# Patient Record
Sex: FEMALE | Race: WHITE | URBAN - METROPOLITAN AREA
[De-identification: names, ages, dates, MRNs, and addresses within clinical notes are randomized per-mention and may not be internally consistent; named-entity substitution may affect disease eponyms.]

---

## 2017-06-04 ENCOUNTER — EMERGENCY (EMERGENCY)
Facility: HOSPITAL | Age: 47
LOS: 0 days | Discharge: HOME | End: 2017-06-05

## 2017-06-28 DIAGNOSIS — R05 COUGH: ICD-10-CM

## 2017-06-28 DIAGNOSIS — R11.0 NAUSEA: ICD-10-CM

## 2017-06-28 DIAGNOSIS — F17.200 NICOTINE DEPENDENCE, UNSPECIFIED, UNCOMPLICATED: ICD-10-CM

## 2017-06-28 DIAGNOSIS — R07.9 CHEST PAIN, UNSPECIFIED: ICD-10-CM

## 2017-06-28 DIAGNOSIS — R10.11 RIGHT UPPER QUADRANT PAIN: ICD-10-CM

## 2017-10-11 ENCOUNTER — OUTPATIENT (OUTPATIENT)
Dept: OUTPATIENT SERVICES | Facility: HOSPITAL | Age: 47
LOS: 1 days | Discharge: HOME | End: 2017-10-11

## 2017-10-11 DIAGNOSIS — Z12.31 ENCOUNTER FOR SCREENING MAMMOGRAM FOR MALIGNANT NEOPLASM OF BREAST: ICD-10-CM

## 2017-10-17 ENCOUNTER — OUTPATIENT (OUTPATIENT)
Dept: OUTPATIENT SERVICES | Facility: HOSPITAL | Age: 47
LOS: 1 days | Discharge: HOME | End: 2017-10-17

## 2017-10-17 DIAGNOSIS — R92.8 OTHER ABNORMAL AND INCONCLUSIVE FINDINGS ON DIAGNOSTIC IMAGING OF BREAST: ICD-10-CM

## 2017-10-18 ENCOUNTER — OUTPATIENT (OUTPATIENT)
Dept: OUTPATIENT SERVICES | Facility: HOSPITAL | Age: 47
LOS: 1 days | Discharge: HOME | End: 2017-10-18

## 2017-10-18 DIAGNOSIS — R10.2 PELVIC AND PERINEAL PAIN: ICD-10-CM

## 2017-10-25 ENCOUNTER — OUTPATIENT (OUTPATIENT)
Dept: OUTPATIENT SERVICES | Facility: HOSPITAL | Age: 47
LOS: 1 days | Discharge: HOME | End: 2017-10-25

## 2017-10-25 DIAGNOSIS — N63.10 UNSPECIFIED LUMP IN THE RIGHT BREAST, UNSPECIFIED QUADRANT: ICD-10-CM

## 2017-10-25 DIAGNOSIS — D24.1 BENIGN NEOPLASM OF RIGHT BREAST: ICD-10-CM

## 2017-11-09 ENCOUNTER — OUTPATIENT (OUTPATIENT)
Dept: OUTPATIENT SERVICES | Facility: HOSPITAL | Age: 47
LOS: 1 days | Discharge: HOME | End: 2017-11-09

## 2017-11-09 DIAGNOSIS — R05 COUGH: ICD-10-CM

## 2017-11-14 PROBLEM — Z00.00 ENCOUNTER FOR PREVENTIVE HEALTH EXAMINATION: Status: ACTIVE | Noted: 2017-11-14

## 2018-04-22 ENCOUNTER — EMERGENCY (EMERGENCY)
Facility: HOSPITAL | Age: 48
LOS: 0 days | Discharge: HOME | End: 2018-04-22
Attending: EMERGENCY MEDICINE | Admitting: EMERGENCY MEDICINE

## 2018-04-22 VITALS
TEMPERATURE: 99 F | HEART RATE: 86 BPM | SYSTOLIC BLOOD PRESSURE: 126 MMHG | OXYGEN SATURATION: 100 % | RESPIRATION RATE: 18 BRPM | DIASTOLIC BLOOD PRESSURE: 76 MMHG

## 2018-04-22 DIAGNOSIS — R31.9 HEMATURIA, UNSPECIFIED: ICD-10-CM

## 2018-04-22 DIAGNOSIS — F17.200 NICOTINE DEPENDENCE, UNSPECIFIED, UNCOMPLICATED: ICD-10-CM

## 2018-04-22 DIAGNOSIS — D25.9 LEIOMYOMA OF UTERUS, UNSPECIFIED: ICD-10-CM

## 2018-04-22 LAB
ALBUMIN SERPL ELPH-MCNC: 4.3 G/DL — SIGNIFICANT CHANGE UP (ref 3.5–5.2)
ALP SERPL-CCNC: 46 U/L — SIGNIFICANT CHANGE UP (ref 30–115)
ALT FLD-CCNC: 11 U/L — SIGNIFICANT CHANGE UP (ref 0–41)
ANION GAP SERPL CALC-SCNC: 13 MMOL/L — SIGNIFICANT CHANGE UP (ref 7–14)
APPEARANCE UR: CLEAR — SIGNIFICANT CHANGE UP
AST SERPL-CCNC: 13 U/L — SIGNIFICANT CHANGE UP (ref 0–41)
BACTERIA # UR AUTO: (no result) /HPF
BASOPHILS # BLD AUTO: 0.04 K/UL — SIGNIFICANT CHANGE UP (ref 0–0.2)
BASOPHILS NFR BLD AUTO: 0.6 % — SIGNIFICANT CHANGE UP (ref 0–1)
BILIRUB SERPL-MCNC: 0.3 MG/DL — SIGNIFICANT CHANGE UP (ref 0.2–1.2)
BILIRUB UR-MCNC: NEGATIVE — SIGNIFICANT CHANGE UP
BUN SERPL-MCNC: 12 MG/DL — SIGNIFICANT CHANGE UP (ref 10–20)
CALCIUM SERPL-MCNC: 9.3 MG/DL — SIGNIFICANT CHANGE UP (ref 8.5–10.1)
CHLORIDE SERPL-SCNC: 107 MMOL/L — SIGNIFICANT CHANGE UP (ref 98–110)
CO2 SERPL-SCNC: 23 MMOL/L — SIGNIFICANT CHANGE UP (ref 17–32)
COLOR SPEC: YELLOW — SIGNIFICANT CHANGE UP
CREAT SERPL-MCNC: 0.9 MG/DL — SIGNIFICANT CHANGE UP (ref 0.7–1.5)
DIFF PNL FLD: (no result)
EOSINOPHIL # BLD AUTO: 0.1 K/UL — SIGNIFICANT CHANGE UP (ref 0–0.7)
EOSINOPHIL NFR BLD AUTO: 1.4 % — SIGNIFICANT CHANGE UP (ref 0–8)
GLUCOSE SERPL-MCNC: 105 MG/DL — HIGH (ref 70–99)
GLUCOSE UR QL: NEGATIVE MG/DL — SIGNIFICANT CHANGE UP
HCT VFR BLD CALC: 39.9 % — SIGNIFICANT CHANGE UP (ref 37–47)
HGB BLD-MCNC: 13.5 G/DL — SIGNIFICANT CHANGE UP (ref 12–16)
IMM GRANULOCYTES NFR BLD AUTO: 0.3 % — SIGNIFICANT CHANGE UP (ref 0.1–0.3)
KETONES UR-MCNC: NEGATIVE — SIGNIFICANT CHANGE UP
LEUKOCYTE ESTERASE UR-ACNC: NEGATIVE — SIGNIFICANT CHANGE UP
LYMPHOCYTES # BLD AUTO: 2.55 K/UL — SIGNIFICANT CHANGE UP (ref 1.2–3.4)
LYMPHOCYTES # BLD AUTO: 36.2 % — SIGNIFICANT CHANGE UP (ref 20.5–51.1)
MCHC RBC-ENTMCNC: 31.3 PG — HIGH (ref 27–31)
MCHC RBC-ENTMCNC: 33.8 G/DL — SIGNIFICANT CHANGE UP (ref 32–37)
MCV RBC AUTO: 92.4 FL — SIGNIFICANT CHANGE UP (ref 81–99)
MONOCYTES # BLD AUTO: 0.54 K/UL — SIGNIFICANT CHANGE UP (ref 0.1–0.6)
MONOCYTES NFR BLD AUTO: 7.7 % — SIGNIFICANT CHANGE UP (ref 1.7–9.3)
NEUTROPHILS # BLD AUTO: 3.8 K/UL — SIGNIFICANT CHANGE UP (ref 1.4–6.5)
NEUTROPHILS NFR BLD AUTO: 53.8 % — SIGNIFICANT CHANGE UP (ref 42.2–75.2)
NITRITE UR-MCNC: NEGATIVE — SIGNIFICANT CHANGE UP
NRBC # BLD: 0 /100 WBCS — SIGNIFICANT CHANGE UP (ref 0–0)
PH UR: 6 — SIGNIFICANT CHANGE UP (ref 5–8)
PLATELET # BLD AUTO: 260 K/UL — SIGNIFICANT CHANGE UP (ref 130–400)
POTASSIUM SERPL-MCNC: 4.1 MMOL/L — SIGNIFICANT CHANGE UP (ref 3.5–5)
POTASSIUM SERPL-SCNC: 4.1 MMOL/L — SIGNIFICANT CHANGE UP (ref 3.5–5)
PROT SERPL-MCNC: 6.7 G/DL — SIGNIFICANT CHANGE UP (ref 6–8)
PROT UR-MCNC: NEGATIVE MG/DL — SIGNIFICANT CHANGE UP
RBC # BLD: 4.32 M/UL — SIGNIFICANT CHANGE UP (ref 4.2–5.4)
RBC # FLD: 14.1 % — SIGNIFICANT CHANGE UP (ref 11.5–14.5)
RBC CASTS # UR COMP ASSIST: (no result) /HPF
SODIUM SERPL-SCNC: 143 MMOL/L — SIGNIFICANT CHANGE UP (ref 135–146)
SP GR SPEC: 1.02 — SIGNIFICANT CHANGE UP (ref 1.01–1.03)
UROBILINOGEN FLD QL: 1 MG/DL (ref 0.2–0.2)
WBC # BLD: 7.05 K/UL — SIGNIFICANT CHANGE UP (ref 4.8–10.8)
WBC # FLD AUTO: 7.05 K/UL — SIGNIFICANT CHANGE UP (ref 4.8–10.8)
WBC UR QL: SIGNIFICANT CHANGE UP /HPF

## 2018-04-22 NOTE — ED PROVIDER NOTE - PHYSICAL EXAMINATION
VITAL SIGNS: I have reviewed nursing notes and confirm.  CONSTITUTIONAL: Well-developed; well-nourished; in no acute distress. comfortable appearing, ambulating in ED   SKIN: Skin exam is warm and dry  HEAD: Normocephalic; atraumatic.  EYES: EOM intact; conjunctiva and sclera clear.  ENT: No nasal discharge; airway clear.   NECK: Supple; non tender.  CARD: S1, S2 normal; Regular rate and rhythm.  RESP: No wheezes, rales or rhonchi.  ABD: Normal bowel sounds; soft; non-distended; non-tender, minimal b/l CVAT, no suprapubic ttp  : (chaperone PCA Marge) - normal appearing external genitalia, + blood in vault, no discharge, no CMT, no adnexal ttp   EXT: Normal ROM. No LE edema.  NEURO: Alert, oriented. Grossly unremarkable. No focal deficits.  PSYCH: Cooperative, appropriate.

## 2018-04-22 NOTE — ED PROVIDER NOTE - OBJECTIVE STATEMENT
47y f hx fibroids, remote cervical ca, asthma, presents w pelvic pain. Associated with both hematuria and vaginal bleeding. Pain is intermittent, also w occasional back pain. Denies dysuria. LMP 2 weeks ago. No abdominal pain. No nausea, vomiting, diarrhea. No fever or chills. Patient had recent uterine and cervical biopsy yesterday after elevated estradiol level.

## 2018-04-22 NOTE — ED PROVIDER NOTE - MEDICAL DECISION MAKING DETAILS
patient w vaginal bleeding, US with fibroids, otherwise unremarkable workup. Will dc. She will follow with Dr. Flanagan. Strict return precautions given.

## 2018-04-22 NOTE — ED PROVIDER NOTE - NS ED ROS FT
Review of Systems    Constitutional: (-) fever  Cardiovascular: (-) chest pain, (-) syncope  Respiratory: (-) cough, (-) shortness of breath  Gastrointestinal: (-) vomiting, (-) diarrhea, (-) abdominal pain  Musculoskeletal: (-) neck pain, (-) back pain, (-) joint pain  Integumentary: (-) rash, (-) edema  Neurological: (-) headache, (-) altered mental status    Except as documented in the HPI, all other systems are negative.

## 2018-04-26 ENCOUNTER — OUTPATIENT (OUTPATIENT)
Dept: OUTPATIENT SERVICES | Facility: HOSPITAL | Age: 48
LOS: 1 days | Discharge: HOME | End: 2018-04-26

## 2018-04-26 ENCOUNTER — TRANSCRIPTION ENCOUNTER (OUTPATIENT)
Age: 48
End: 2018-04-26

## 2018-04-26 DIAGNOSIS — R92.8 OTHER ABNORMAL AND INCONCLUSIVE FINDINGS ON DIAGNOSTIC IMAGING OF BREAST: ICD-10-CM

## 2018-10-29 ENCOUNTER — OUTPATIENT (OUTPATIENT)
Dept: OUTPATIENT SERVICES | Facility: HOSPITAL | Age: 48
LOS: 1 days | Discharge: HOME | End: 2018-10-29

## 2018-10-29 DIAGNOSIS — N64.4 MASTODYNIA: ICD-10-CM

## 2018-12-04 ENCOUNTER — EMERGENCY (EMERGENCY)
Facility: HOSPITAL | Age: 48
LOS: 0 days | Discharge: HOME | End: 2018-12-04
Attending: EMERGENCY MEDICINE | Admitting: EMERGENCY MEDICINE

## 2018-12-04 ENCOUNTER — TRANSCRIPTION ENCOUNTER (OUTPATIENT)
Age: 48
End: 2018-12-04

## 2018-12-04 VITALS
OXYGEN SATURATION: 99 % | TEMPERATURE: 98 F | RESPIRATION RATE: 16 BRPM | DIASTOLIC BLOOD PRESSURE: 67 MMHG | SYSTOLIC BLOOD PRESSURE: 104 MMHG | HEART RATE: 64 BPM

## 2018-12-04 VITALS
HEART RATE: 65 BPM | DIASTOLIC BLOOD PRESSURE: 78 MMHG | TEMPERATURE: 98 F | OXYGEN SATURATION: 97 % | SYSTOLIC BLOOD PRESSURE: 119 MMHG | RESPIRATION RATE: 20 BRPM | HEIGHT: 65 IN | WEIGHT: 156.97 LBS

## 2018-12-04 DIAGNOSIS — M54.2 CERVICALGIA: ICD-10-CM

## 2018-12-04 DIAGNOSIS — M65.29: ICD-10-CM

## 2018-12-04 DIAGNOSIS — R68.84 JAW PAIN: ICD-10-CM

## 2018-12-04 DIAGNOSIS — H92.02 OTALGIA, LEFT EAR: ICD-10-CM

## 2018-12-04 RX ORDER — KETOROLAC TROMETHAMINE 30 MG/ML
1 SYRINGE (ML) INJECTION
Qty: 9 | Refills: 0
Start: 2018-12-04 | End: 2018-12-06

## 2018-12-04 RX ORDER — KETOROLAC TROMETHAMINE 30 MG/ML
15 SYRINGE (ML) INJECTION ONCE
Qty: 0 | Refills: 0 | Status: DISCONTINUED | OUTPATIENT
Start: 2018-12-04 | End: 2018-12-04

## 2018-12-04 RX ADMIN — Medication 15 MILLIGRAM(S): at 08:06

## 2018-12-04 NOTE — ED PROVIDER NOTE - NS ED ROS FT
Constitutional: Subjective chills yesterday, today. No fever, night sweats, weight loss.  ENMT:  Left ear, jaw pain. No tooth pain.   Cardiac:  No chest pain, SOB or edema. No chest pain with exertion.  Respiratory:  No cough or respiratory distress. No hemoptysis. No history of asthma or RAD.  GI:  No nausea, vomiting, diarrhea or abdominal pain.  MS:  Pain in left side of neck. Worsened with movement.  Neuro:  No parasthesias in UE's.  No weakness. No LOC.  Skin:  No skin rash.   Endocrine: No history of thyroid disease or diabetes.

## 2018-12-04 NOTE — ED ADULT NURSE NOTE - OBJECTIVE STATEMENT
pt reports that around 2 days ago, her neck began to ache after hanging antonie decorations. over time, pain became worsened. now pt can't move her neck and c/o pain

## 2018-12-04 NOTE — ED ADULT NURSE NOTE - NSIMPLEMENTINTERV_GEN_ALL_ED
Implemented All Universal Safety Interventions:  [place] to call system. Call bell, personal items and telephone within reach. Instruct patient to call for assistance. Room bathroom lighting operational. Non-slip footwear when patient is off stretcher. Physically safe environment: no spills, clutter or unnecessary equipment. Stretcher in lowest position, wheels locked, appropriate side rails in place.

## 2018-12-04 NOTE — ED PROVIDER NOTE - CARE PROVIDER_API CALL
Socrates Armenta (MD), [facility]  [address] [place] [address]  [address] [place] [address]  Phone: [phone]  Fax: [phone]

## 2018-12-04 NOTE — ED PROVIDER NOTE - ATTENDING CONTRIBUTION TO CARE
48 y F to ED with neck pain posterior that started while putting up decorations.  No fevers, no sick contacts, no travels, no injury or fall.  pain with looking up and palpation to posterior neck.  seen at [facility] and no relief with motrin/tizanidine    AVSS, exam as noted, CTAB, RRR, abdomen soft NTND, (+) bowel sounds, neuro nonfocal

## 2018-12-04 NOTE — ED PROVIDER NOTE - PHYSICAL EXAMINATION
Constitutional: Uncomfortable. Able to complete full sentences w/o difficulty.  Head: Atraumatic.  Eyes: PERRLA. EOMI without discomfort.   ENT: TM's visualized b/l. No assoc erythema, bulging, discharge.  Neck: TTP b/l paraspinally. Pain with active and passive ROM. No masses palpated, no thyromegaly.  Cardiovascular: Regular rate. Normal S1 and S2. No murmurs. 2+ pulses in all extremities.   Pulmonary: CTAB  Abdominal: Soft. Nondistended. Nontender. No rebound or guarding.   Skin: No rashes.   Neuro: AAOx3. No focal neurological deficits.

## 2018-12-04 NOTE — ED PROVIDER NOTE - OBJECTIVE STATEMENT
Pt with hx of cervical cancer, fibriods, asthma c/o left sided neck pain for the past 2 days a/w dysphagia, horseness, chills, and left sided ear pain that began yesterday. Pt unable to move her neck due to pain. Pt visited urgent care on Monday and received Tizanidine and Motrin, neither of which helped. Pt has FH of CA in her mother and father.

## 2018-12-04 NOTE — ED PROVIDER NOTE - PROGRESS NOTE DETAILS
Pt is PA. FH of cancer in mother and father. Personal hx cervical CA. CT spine noncon, IM toradol. Reassess after CT and toradol.

## 2018-12-10 ENCOUNTER — APPOINTMENT (OUTPATIENT)
Age: 48
End: 2018-12-10
Payer: COMMERCIAL

## 2018-12-10 VITALS — BODY MASS INDEX: 26.49 KG/M2 | HEIGHT: 65 IN | WEIGHT: 159 LBS

## 2018-12-10 PROCEDURE — 99203 OFFICE O/P NEW LOW 30 MIN: CPT

## 2018-12-17 ENCOUNTER — FORM ENCOUNTER (OUTPATIENT)
Age: 48
End: 2018-12-17

## 2018-12-18 ENCOUNTER — OUTPATIENT (OUTPATIENT)
Dept: OUTPATIENT SERVICES | Facility: HOSPITAL | Age: 48
LOS: 1 days | Discharge: HOME | End: 2018-12-18

## 2018-12-18 DIAGNOSIS — M54.2 CERVICALGIA: ICD-10-CM

## 2018-12-26 ENCOUNTER — APPOINTMENT (OUTPATIENT)
Dept: NEUROSURGERY | Facility: CLINIC | Age: 48
End: 2018-12-26
Payer: COMMERCIAL

## 2018-12-26 DIAGNOSIS — M47.12 OTHER SPONDYLOSIS WITH MYELOPATHY, CERVICAL REGION: ICD-10-CM

## 2018-12-26 DIAGNOSIS — M47.22 OTHER SPONDYLOSIS WITH MYELOPATHY, CERVICAL REGION: ICD-10-CM

## 2018-12-26 PROCEDURE — 99212 OFFICE O/P EST SF 10 MIN: CPT

## 2019-04-29 ENCOUNTER — EMERGENCY (EMERGENCY)
Facility: HOSPITAL | Age: 49
LOS: 0 days | Discharge: HOME | End: 2019-04-30
Attending: EMERGENCY MEDICINE | Admitting: EMERGENCY MEDICINE
Payer: COMMERCIAL

## 2019-04-29 ENCOUNTER — TRANSCRIPTION ENCOUNTER (OUTPATIENT)
Age: 49
End: 2019-04-29

## 2019-04-29 VITALS
SYSTOLIC BLOOD PRESSURE: 148 MMHG | OXYGEN SATURATION: 100 % | RESPIRATION RATE: 21 BRPM | TEMPERATURE: 98 F | HEART RATE: 80 BPM | DIASTOLIC BLOOD PRESSURE: 72 MMHG

## 2019-04-29 DIAGNOSIS — N92.0 EXCESSIVE AND FREQUENT MENSTRUATION WITH REGULAR CYCLE: ICD-10-CM

## 2019-04-29 DIAGNOSIS — R07.89 OTHER CHEST PAIN: ICD-10-CM

## 2019-04-29 DIAGNOSIS — Z82.49 FAMILY HISTORY OF ISCHEMIC HEART DISEASE AND OTHER DISEASES OF THE CIRCULATORY SYSTEM: ICD-10-CM

## 2019-04-29 DIAGNOSIS — R42 DIZZINESS AND GIDDINESS: ICD-10-CM

## 2019-04-29 DIAGNOSIS — R06.02 SHORTNESS OF BREATH: ICD-10-CM

## 2019-04-29 DIAGNOSIS — Z87.891 PERSONAL HISTORY OF NICOTINE DEPENDENCE: ICD-10-CM

## 2019-04-29 DIAGNOSIS — R07.9 CHEST PAIN, UNSPECIFIED: ICD-10-CM

## 2019-04-29 LAB
ALBUMIN SERPL ELPH-MCNC: 4.8 G/DL — SIGNIFICANT CHANGE UP (ref 3.5–5.2)
ALP SERPL-CCNC: 46 U/L — SIGNIFICANT CHANGE UP (ref 30–115)
ALT FLD-CCNC: 20 U/L — SIGNIFICANT CHANGE UP (ref 0–41)
ANION GAP SERPL CALC-SCNC: 15 MMOL/L — HIGH (ref 7–14)
APTT BLD: 29.7 SEC — SIGNIFICANT CHANGE UP (ref 27–39.2)
AST SERPL-CCNC: 27 U/L — SIGNIFICANT CHANGE UP (ref 0–41)
BASOPHILS # BLD AUTO: 0.05 K/UL — SIGNIFICANT CHANGE UP (ref 0–0.2)
BASOPHILS NFR BLD AUTO: 0.4 % — SIGNIFICANT CHANGE UP (ref 0–1)
BILIRUB SERPL-MCNC: 0.3 MG/DL — SIGNIFICANT CHANGE UP (ref 0.2–1.2)
BUN SERPL-MCNC: 12 MG/DL — SIGNIFICANT CHANGE UP (ref 10–20)
CALCIUM SERPL-MCNC: 10.2 MG/DL — HIGH (ref 8.5–10.1)
CHLORIDE SERPL-SCNC: 101 MMOL/L — SIGNIFICANT CHANGE UP (ref 98–110)
CO2 SERPL-SCNC: 22 MMOL/L — SIGNIFICANT CHANGE UP (ref 17–32)
CREAT SERPL-MCNC: 0.9 MG/DL — SIGNIFICANT CHANGE UP (ref 0.7–1.5)
D DIMER BLD IA.RAPID-MCNC: 314 NG/ML DDU — HIGH (ref 0–230)
EOSINOPHIL # BLD AUTO: 0.09 K/UL — SIGNIFICANT CHANGE UP (ref 0–0.7)
EOSINOPHIL NFR BLD AUTO: 0.7 % — SIGNIFICANT CHANGE UP (ref 0–8)
GLUCOSE SERPL-MCNC: 88 MG/DL — SIGNIFICANT CHANGE UP (ref 70–99)
HCT VFR BLD CALC: 38.4 % — SIGNIFICANT CHANGE UP (ref 37–47)
HGB BLD-MCNC: 13 G/DL — SIGNIFICANT CHANGE UP (ref 12–16)
IMM GRANULOCYTES NFR BLD AUTO: 0.3 % — SIGNIFICANT CHANGE UP (ref 0.1–0.3)
INR BLD: 0.98 RATIO — SIGNIFICANT CHANGE UP (ref 0.65–1.3)
LYMPHOCYTES # BLD AUTO: 2.72 K/UL — SIGNIFICANT CHANGE UP (ref 1.2–3.4)
LYMPHOCYTES # BLD AUTO: 21.2 % — SIGNIFICANT CHANGE UP (ref 20.5–51.1)
MCHC RBC-ENTMCNC: 31 PG — SIGNIFICANT CHANGE UP (ref 27–31)
MCHC RBC-ENTMCNC: 33.9 G/DL — SIGNIFICANT CHANGE UP (ref 32–37)
MCV RBC AUTO: 91.6 FL — SIGNIFICANT CHANGE UP (ref 81–99)
MONOCYTES # BLD AUTO: 0.6 K/UL — SIGNIFICANT CHANGE UP (ref 0.1–0.6)
MONOCYTES NFR BLD AUTO: 4.7 % — SIGNIFICANT CHANGE UP (ref 1.7–9.3)
NEUTROPHILS # BLD AUTO: 9.35 K/UL — HIGH (ref 1.4–6.5)
NEUTROPHILS NFR BLD AUTO: 72.7 % — SIGNIFICANT CHANGE UP (ref 42.2–75.2)
NRBC # BLD: 0 /100 WBCS — SIGNIFICANT CHANGE UP (ref 0–0)
PLATELET # BLD AUTO: 285 K/UL — SIGNIFICANT CHANGE UP (ref 130–400)
POTASSIUM SERPL-MCNC: 4.3 MMOL/L — SIGNIFICANT CHANGE UP (ref 3.5–5)
POTASSIUM SERPL-SCNC: 4.3 MMOL/L — SIGNIFICANT CHANGE UP (ref 3.5–5)
PROT SERPL-MCNC: 7.3 G/DL — SIGNIFICANT CHANGE UP (ref 6–8)
PROTHROM AB SERPL-ACNC: 11.3 SEC — SIGNIFICANT CHANGE UP (ref 9.95–12.87)
RBC # BLD: 4.19 M/UL — LOW (ref 4.2–5.4)
RBC # FLD: 13.8 % — SIGNIFICANT CHANGE UP (ref 11.5–14.5)
SODIUM SERPL-SCNC: 138 MMOL/L — SIGNIFICANT CHANGE UP (ref 135–146)
TROPONIN T SERPL-MCNC: <0.01 NG/ML — SIGNIFICANT CHANGE UP
WBC # BLD: 12.85 K/UL — HIGH (ref 4.8–10.8)
WBC # FLD AUTO: 12.85 K/UL — HIGH (ref 4.8–10.8)

## 2019-04-29 PROCEDURE — 71046 X-RAY EXAM CHEST 2 VIEWS: CPT | Mod: 26

## 2019-04-29 NOTE — ED PROVIDER NOTE - OBJECTIVE STATEMENT
49 yo female no sig hx present c/o SOB with off/on left sided chest pain/pressure x 1 day. denies modifying factor. reporting feeling she couldn't take a deep breath and feeling trouble to breath. also reported lightheadedness but denies HA/palpitations/leg swelling and pain. recently started on Progesterone few days ago. denies fever/chill/abd pain/n/v/d and urinary sxs. 47 yo female no sig hx present c/o SOB with off/on left sided chest pain/pressure x 1 day. denies modifying factor. reporting feeling she couldn't take a deep breath and feeling trouble to breath. That happened while she was working out at the gym. also reported lightheadedness but denies HA/palpitations/leg swelling and pain. recently started on Progesterone few days ago. denies fever/chill/abd pain/n/v/d and urinary sxs.

## 2019-04-29 NOTE — ED PROVIDER NOTE - ATTENDING CONTRIBUTION TO CARE
I personally evaluated the patient. I reviewed the Resident’s or Physician Assistant’s note (as assigned above), and agree with the findings and plan except as documented in my note.     48 female recently quit smoking here for dyspnea and chest discomfort. On HRT for menopause. Chest discomfort is midsternal, non radiating, worse today, associated with SOB, no accompanying constitutional symptoms. Admits to menorrhagia.     PE: female in no distress. HEENT: non icteric. conjunctiva pink. CHEST: CTA bilateral. CV: pulses intact SKIN normal.     Impression: chest pain    Plan: IV labs imaging supportive care and reevaluation, likely dispo to [facility]

## 2019-04-29 NOTE — ED ADULT NURSE NOTE - OBJECTIVE STATEMENT
PT reports sob x 3 days worse today, pt was able to still workout this evening before coming in to the ER. Alert and oriented x 4. Pt denies pain but reports difficulty taking a deep breath, slightly tachypneic. No other complaints.

## 2019-04-29 NOTE — ED ADULT TRIAGE NOTE - CHIEF COMPLAINT QUOTE
SOb x 3 days and worsening today, recently started hormone therapy for vaginal bleeding, tachypneic at triage

## 2019-04-29 NOTE — ED PROVIDER NOTE - PHYSICAL EXAMINATION
CONSTITUTIONAL: Well-appearing; well-nourished; in no apparent distress.   NECK: No JVD.   CARDIOVASCULAR: Normal S1, S2; no murmurs, rubs, or gallops.   RESPIRATORY: Normal chest excursion with respiration; breath sounds clear and equal bilaterally; no wheezes, rhonchi, or rales.  GI/: Normal bowel sounds; non-distended; non-tender; no palpable organomegaly.   MS: No evidence of trauma or deformity to extremities. no calf tenderness and swelling  SKIN: Normal for age and race; warm; dry; good turgor; no apparent lesions or exudate.   NEURO/PSYCH: A & O x 4; grossly unremarkable. + anxious mood

## 2019-04-29 NOTE — ED PROVIDER NOTE - FAMILY HISTORY
No pertinent family history in first degree relatives Father  Still living? Unknown  Family history of coronary artery disease, Age at diagnosis: 51-60

## 2019-04-30 VITALS
DIASTOLIC BLOOD PRESSURE: 66 MMHG | TEMPERATURE: 97 F | OXYGEN SATURATION: 97 % | HEART RATE: 72 BPM | SYSTOLIC BLOOD PRESSURE: 121 MMHG | RESPIRATION RATE: 18 BRPM

## 2019-04-30 LAB — TROPONIN T SERPL-MCNC: <0.01 NG/ML — SIGNIFICANT CHANGE UP

## 2019-04-30 PROCEDURE — 99236 HOSP IP/OBS SAME DATE HI 85: CPT

## 2019-04-30 PROCEDURE — 78452 HT MUSCLE IMAGE SPECT MULT: CPT | Mod: 26

## 2019-04-30 PROCEDURE — 71275 CT ANGIOGRAPHY CHEST: CPT | Mod: 26

## 2019-04-30 PROCEDURE — 93016 CV STRESS TEST SUPVJ ONLY: CPT

## 2019-04-30 PROCEDURE — 93306 TTE W/DOPPLER COMPLETE: CPT | Mod: 26

## 2019-04-30 PROCEDURE — 93018 CV STRESS TEST I&R ONLY: CPT

## 2019-04-30 RX ORDER — ASPIRIN/CALCIUM CARB/MAGNESIUM 324 MG
325 TABLET ORAL ONCE
Qty: 0 | Refills: 0 | Status: COMPLETED | OUTPATIENT
Start: 2019-04-30 | End: 2019-04-30

## 2019-04-30 RX ORDER — ADENOSINE 3 MG/ML
60 INJECTION INTRAVENOUS ONCE
Qty: 0 | Refills: 0 | Status: COMPLETED | OUTPATIENT
Start: 2019-04-30 | End: 2019-04-30

## 2019-04-30 RX ADMIN — ADENOSINE 600 MILLIGRAM(S): 3 INJECTION INTRAVENOUS at 11:01

## 2019-04-30 RX ADMIN — Medication 325 MILLIGRAM(S): at 03:58

## 2019-04-30 NOTE — ED CDU PROVIDER INITIAL DAY NOTE - OBJECTIVE STATEMENT
49 y/o F without PMH presents with SOB and intermittent sharp L chest discomfort x 1 day, worse with laying down. symptoms began with onset of progesterone rx for vaginal bleeding x 1 month due to fibroids, followed by gyn. +worse with deep breathing. denies symptoms on exertion. +strong family hx of sudden cardiac death in 40s in her uncle and 3 cousins. no palpitations.   no recent uri. Denies hemoptysis, recent surgery/immobilization, hx cancers, hx PE/DVT.   made appointment with aylin on weds from cardio.

## 2019-04-30 NOTE — ED ADULT NURSE REASSESSMENT NOTE - NS ED NURSE REASSESS COMMENT FT1
Pt reassessed A.O times 4 Vs stable  report filling much better , is seen evaluate by Ed attending clear to go home NAD noted left with family members .

## 2019-04-30 NOTE — ED CDU PROVIDER INITIAL DAY NOTE - NS ED ROS FT
Review of Systems    Constitutional: (-) fever   Eyes/ENT: (-) vision changes  Cardiovascular: (+) chest pain, (-) syncope (-) palpitations  Respiratory: (-) cough, (+) shortness of breath  Gastrointestinal: (-) vomiting, (-) diarrhea (-) abdominal pain  Genitourinary:  (-) dysuria   Musculoskeletal: (-) neck pain, (-) back pain, (-) leg pain/swelling  Integumentary: (-) rash, (-) edema  Neurological: (-) headache  Hematologic: (-) easy bruising   Psychiatric: (-) hallucinations  Allergic/Immunologic: (-) pruritus

## 2019-04-30 NOTE — ED CDU PROVIDER DISPOSITION NOTE - CLINICAL COURSE
Patient was sent to [facility] for further evaluation of atypical chest pains, has remained in no distress and with stable vitals, ekgs times two no evidence of ischemic changes, enzymes times two normal Nuclear stres testing was read as normal, CT chest no PE, Copies of all blood work and other studies were given to patient and will fallow up with both PMD and Cardiology next week

## 2019-04-30 NOTE — ED ADULT NURSE REASSESSMENT NOTE - NS ED NURSE REASSESS COMMENT FT1
Pt received from previous RN, alert and awake, NAD, VSS, denies chest pain and SOB, cardiac monitor maintained. Will continue to monitor.

## 2019-04-30 NOTE — ED CDU PROVIDER DISPOSITION NOTE - NSFOLLOWUPINSTRUCTIONS_ED_ALL_ED_FT
As we discussed, all of the testing that we did here in the ER was normal. All of those results have been given to you, and are also included in your discharge paperwork. Please follow up with your primary doctor and with your OBGYN as needed.   I have included another OB doctor in the referrals section for your convenience.     Shortness of breath    Shortness of breath (dyspnea) means you have trouble breathing and could indicate a medical problem. Causes include lung disease, heart disease, low amount of red blood cells (anemia), poor physical fitness, being overweight, smoking, etc. Your health care provider today may not be able to find a cause for your shortness of breath after your exam. In this case, it is important to have a follow-up exam with your primary care physician as instructed. If medicines were prescribed, take them as directed for the full length of time directed. Refrain from tobacco products.    SEEK IMMEDIATE MEDICAL CARE IF YOU HAVE ANY OF THE FOLLOWING SYMPTOMS: worsening shortness of breath, chest pain, back pain, abdominal pain, fever, coughing up blood, lightheadedness/dizziness.

## 2019-04-30 NOTE — ED CDU PROVIDER SUBSEQUENT DAY NOTE - HISTORY
49 y/o F without PMH presents with SOB and intermittent sharp L chest discomfort x 1 day, worse with laying down. +strong family hx of sudden cardiac death in 40s in her uncle and 3 cousins. resting comfortably now.

## 2019-04-30 NOTE — ED CDU PROVIDER INITIAL DAY NOTE - PHYSICAL EXAMINATION
PHYSICAL EXAM:    GENERAL: Alert, appears stated age, well appearing, non-toxic  SKIN: Warm, pink and dry. MMM.   EYE: Normal lids/conjunctiva  ENT: Normal hearing, patent oropharynx   NECK: +supple. No meningismus, or JVD  Pulm: Bilateral BS, normal resp effort, no wheezes, stridor, or retractions  CV: RRR, no M/R/G, 2+and = radial pulses. no TTP of precordium.   Abd: soft, non-tender, non-distended  Mskel: no erythema, cyanosis, edema. no calf tenderness  Neuro: AAOx3. 5/5 strength throughout. normal gait.

## 2019-04-30 NOTE — ED CDU PROVIDER DISPOSITION NOTE - PROVIDER TOKENS
PROVIDER:[TOKEN:[[id number]],FOLLOWUP:[4-6 Days]],PROVIDER:[TOKEN:[[id number]],FOLLOWUP:[1-3 Days]]

## 2019-04-30 NOTE — ED CDU PROVIDER DISPOSITION NOTE - CARE PROVIDER_API CALL
Nish Fonseca)  Obstetrics and Gynecology  [address] [place] [address]  Phone: [phone]  Fax: [phone]  Follow Up Time: 4-6 Days    Santos Hernandez ()  Internal Medicine  [facility] [place] [address]  Phone: [phone]  Fax: [phone]  Follow Up Time: 1-3 Days

## 2019-04-30 NOTE — ED ADULT NURSE REASSESSMENT NOTE - NS ED NURSE REASSESS COMMENT FT1
Pt assessed A/O times 4 VS stable remain comfortable denies no chest pain no SOB no N/V or dizziness ambulate steady ,pt is seen evaluate by ED attending waith order  for 2DECHO ready to be transport with transporter.

## 2019-04-30 NOTE — ED CDU PROVIDER INITIAL DAY NOTE - ATTENDING CONTRIBUTION TO CARE
Seen with PA agree with above, lungs- clear, abdomen- soft no tenderness to any region, neuro - non focal, s1s2 no gallops or murmurs, full workup in progress will continue to re-evaluate

## 2019-05-01 ENCOUNTER — APPOINTMENT (OUTPATIENT)
Dept: CARDIOLOGY | Facility: CLINIC | Age: 49
End: 2019-05-01

## 2019-08-15 PROBLEM — J45.909 UNSPECIFIED ASTHMA, UNCOMPLICATED: Chronic | Status: ACTIVE | Noted: 2019-04-29

## 2019-08-20 ENCOUNTER — OUTPATIENT (OUTPATIENT)
Dept: OUTPATIENT SERVICES | Facility: HOSPITAL | Age: 49
LOS: 1 days | Discharge: HOME | End: 2019-08-20
Payer: COMMERCIAL

## 2019-08-20 DIAGNOSIS — R92.8 OTHER ABNORMAL AND INCONCLUSIVE FINDINGS ON DIAGNOSTIC IMAGING OF BREAST: ICD-10-CM

## 2019-08-20 PROCEDURE — 77061 BREAST TOMOSYNTHESIS UNI: CPT | Mod: 26,RT

## 2019-08-20 PROCEDURE — 76642 ULTRASOUND BREAST LIMITED: CPT | Mod: 26,RT

## 2019-08-20 PROCEDURE — 77065 DX MAMMO INCL CAD UNI: CPT | Mod: 26,RT

## 2019-08-20 PROCEDURE — G0279: CPT | Mod: 26,RT

## 2019-11-18 ENCOUNTER — OUTPATIENT (OUTPATIENT)
Dept: OUTPATIENT SERVICES | Facility: HOSPITAL | Age: 49
LOS: 1 days | Discharge: HOME | End: 2019-11-18
Payer: COMMERCIAL

## 2019-11-18 DIAGNOSIS — M79.604 PAIN IN RIGHT LEG: ICD-10-CM

## 2019-11-18 PROCEDURE — 93971 EXTREMITY STUDY: CPT | Mod: 26,RT

## 2019-12-13 ENCOUNTER — OUTPATIENT (OUTPATIENT)
Dept: OUTPATIENT SERVICES | Facility: HOSPITAL | Age: 49
LOS: 1 days | Discharge: HOME | End: 2019-12-13
Payer: COMMERCIAL

## 2019-12-13 VITALS
HEART RATE: 88 BPM | OXYGEN SATURATION: 98 % | RESPIRATION RATE: 16 BRPM | SYSTOLIC BLOOD PRESSURE: 95 MMHG | HEIGHT: 65 IN | WEIGHT: 164.91 LBS | DIASTOLIC BLOOD PRESSURE: 60 MMHG | TEMPERATURE: 98 F

## 2019-12-13 DIAGNOSIS — Z90.49 ACQUIRED ABSENCE OF OTHER SPECIFIED PARTS OF DIGESTIVE TRACT: Chronic | ICD-10-CM

## 2019-12-13 DIAGNOSIS — Z98.890 OTHER SPECIFIED POSTPROCEDURAL STATES: Chronic | ICD-10-CM

## 2019-12-13 DIAGNOSIS — N92.1 EXCESSIVE AND FREQUENT MENSTRUATION WITH IRREGULAR CYCLE: ICD-10-CM

## 2019-12-13 DIAGNOSIS — Z01.818 ENCOUNTER FOR OTHER PREPROCEDURAL EXAMINATION: ICD-10-CM

## 2019-12-13 DIAGNOSIS — D21.9 BENIGN NEOPLASM OF CONNECTIVE AND OTHER SOFT TISSUE, UNSPECIFIED: ICD-10-CM

## 2019-12-13 DIAGNOSIS — N92.4 EXCESSIVE BLEEDING IN THE PREMENOPAUSAL PERIOD: ICD-10-CM

## 2019-12-13 LAB
ALBUMIN SERPL ELPH-MCNC: 4.8 G/DL — SIGNIFICANT CHANGE UP (ref 3.5–5.2)
ALP SERPL-CCNC: 64 U/L — SIGNIFICANT CHANGE UP (ref 30–115)
ALT FLD-CCNC: 18 U/L — SIGNIFICANT CHANGE UP (ref 0–41)
ANION GAP SERPL CALC-SCNC: 14 MMOL/L — SIGNIFICANT CHANGE UP (ref 7–14)
APPEARANCE UR: CLEAR — SIGNIFICANT CHANGE UP
APTT BLD: 33.2 SEC — SIGNIFICANT CHANGE UP (ref 27–39.2)
AST SERPL-CCNC: 19 U/L — SIGNIFICANT CHANGE UP (ref 0–41)
BACTERIA # UR AUTO: NEGATIVE — SIGNIFICANT CHANGE UP
BASOPHILS # BLD AUTO: 0.05 K/UL — SIGNIFICANT CHANGE UP (ref 0–0.2)
BASOPHILS NFR BLD AUTO: 0.6 % — SIGNIFICANT CHANGE UP (ref 0–1)
BILIRUB SERPL-MCNC: 0.3 MG/DL — SIGNIFICANT CHANGE UP (ref 0.2–1.2)
BILIRUB UR-MCNC: NEGATIVE — SIGNIFICANT CHANGE UP
BUN SERPL-MCNC: 13 MG/DL — SIGNIFICANT CHANGE UP (ref 10–20)
CALCIUM SERPL-MCNC: 10.1 MG/DL — SIGNIFICANT CHANGE UP (ref 8.5–10.1)
CHLORIDE SERPL-SCNC: 104 MMOL/L — SIGNIFICANT CHANGE UP (ref 98–110)
CO2 SERPL-SCNC: 24 MMOL/L — SIGNIFICANT CHANGE UP (ref 17–32)
COLOR SPEC: YELLOW — SIGNIFICANT CHANGE UP
CREAT SERPL-MCNC: 0.8 MG/DL — SIGNIFICANT CHANGE UP (ref 0.7–1.5)
DIFF PNL FLD: ABNORMAL
EOSINOPHIL # BLD AUTO: 0.07 K/UL — SIGNIFICANT CHANGE UP (ref 0–0.7)
EOSINOPHIL NFR BLD AUTO: 0.9 % — SIGNIFICANT CHANGE UP (ref 0–8)
EPI CELLS # UR: 2 /HPF — SIGNIFICANT CHANGE UP (ref 0–5)
GLUCOSE SERPL-MCNC: 84 MG/DL — SIGNIFICANT CHANGE UP (ref 70–99)
GLUCOSE UR QL: NEGATIVE — SIGNIFICANT CHANGE UP
HCT VFR BLD CALC: 42.6 % — SIGNIFICANT CHANGE UP (ref 37–47)
HGB BLD-MCNC: 14 G/DL — SIGNIFICANT CHANGE UP (ref 12–16)
HYALINE CASTS # UR AUTO: 1 /LPF — SIGNIFICANT CHANGE UP (ref 0–7)
IMM GRANULOCYTES NFR BLD AUTO: 0.1 % — SIGNIFICANT CHANGE UP (ref 0.1–0.3)
INR BLD: 1.05 RATIO — SIGNIFICANT CHANGE UP (ref 0.65–1.3)
KETONES UR-MCNC: SIGNIFICANT CHANGE UP
LEUKOCYTE ESTERASE UR-ACNC: NEGATIVE — SIGNIFICANT CHANGE UP
LYMPHOCYTES # BLD AUTO: 2.19 K/UL — SIGNIFICANT CHANGE UP (ref 1.2–3.4)
LYMPHOCYTES # BLD AUTO: 27.5 % — SIGNIFICANT CHANGE UP (ref 20.5–51.1)
MCHC RBC-ENTMCNC: 29.5 PG — SIGNIFICANT CHANGE UP (ref 27–31)
MCHC RBC-ENTMCNC: 32.9 G/DL — SIGNIFICANT CHANGE UP (ref 32–37)
MCV RBC AUTO: 89.7 FL — SIGNIFICANT CHANGE UP (ref 81–99)
MONOCYTES # BLD AUTO: 0.58 K/UL — SIGNIFICANT CHANGE UP (ref 0.1–0.6)
MONOCYTES NFR BLD AUTO: 7.3 % — SIGNIFICANT CHANGE UP (ref 1.7–9.3)
NEUTROPHILS # BLD AUTO: 5.05 K/UL — SIGNIFICANT CHANGE UP (ref 1.4–6.5)
NEUTROPHILS NFR BLD AUTO: 63.6 % — SIGNIFICANT CHANGE UP (ref 42.2–75.2)
NITRITE UR-MCNC: NEGATIVE — SIGNIFICANT CHANGE UP
NRBC # BLD: 0 /100 WBCS — SIGNIFICANT CHANGE UP (ref 0–0)
PH UR: 6 — SIGNIFICANT CHANGE UP (ref 5–8)
PLATELET # BLD AUTO: 255 K/UL — SIGNIFICANT CHANGE UP (ref 130–400)
POTASSIUM SERPL-MCNC: 4.3 MMOL/L — SIGNIFICANT CHANGE UP (ref 3.5–5)
POTASSIUM SERPL-SCNC: 4.3 MMOL/L — SIGNIFICANT CHANGE UP (ref 3.5–5)
PROT SERPL-MCNC: 7.4 G/DL — SIGNIFICANT CHANGE UP (ref 6–8)
PROT UR-MCNC: NEGATIVE — SIGNIFICANT CHANGE UP
PROTHROM AB SERPL-ACNC: 12.1 SEC — SIGNIFICANT CHANGE UP (ref 9.95–12.87)
RBC # BLD: 4.75 M/UL — SIGNIFICANT CHANGE UP (ref 4.2–5.4)
RBC # FLD: 14.7 % — HIGH (ref 11.5–14.5)
RBC CASTS # UR COMP ASSIST: 2 /HPF — SIGNIFICANT CHANGE UP (ref 0–4)
SODIUM SERPL-SCNC: 142 MMOL/L — SIGNIFICANT CHANGE UP (ref 135–146)
SP GR SPEC: 1.02 — SIGNIFICANT CHANGE UP (ref 1.01–1.02)
UROBILINOGEN FLD QL: SIGNIFICANT CHANGE UP
WBC # BLD: 7.95 K/UL — SIGNIFICANT CHANGE UP (ref 4.8–10.8)
WBC # FLD AUTO: 7.95 K/UL — SIGNIFICANT CHANGE UP (ref 4.8–10.8)
WBC UR QL: 1 /HPF — SIGNIFICANT CHANGE UP (ref 0–5)

## 2019-12-13 PROCEDURE — 93010 ELECTROCARDIOGRAM REPORT: CPT

## 2019-12-13 NOTE — H&P PST ADULT - NSICDXPASTMEDICALHX_GEN_ALL_CORE_FT
PAST MEDICAL HISTORY:  Asthma     Cervical dysplasia s/p leep    Chronic neck and back pain     Fibroid uterus     Fuchs' corneal dystrophy

## 2019-12-13 NOTE — H&P PST ADULT - REASON FOR ADMISSION
50 yo female presents c/o "fibroids& heavy bleeding, I am having a d&c& ablation"  denies chest pain, palpitations, shortness of breath, dyspnea, or dysuria. exercise tolerance: 2 blocks/ flights of stairs w/o sob

## 2019-12-13 NOTE — H&P PST ADULT - NSICDXFAMILYHX_GEN_ALL_CORE_FT
FAMILY HISTORY:  Father  Still living? Unknown  Family history of coronary artery disease, Age at diagnosis: 51-60

## 2020-03-23 NOTE — H&P PST ADULT - ABILITY TO HEAR (WITH HEARING AID OR HEARING APPLIANCE IF NORMALLY USED):
Writer received a letter of denial for Quetiapine 100mg for an exception in the cost sharing tier. Writer called and spoke to patient, patient was not able to get the most recent Rx filled because insurance does not cover it. However there was an old script on file that was filled. Quetiapine 400mg, 2 tabs daily was filled. Pt is only taking one at night. Pt would like provider to call him, when in office to discuss increasing medication. If new script is written for future, maybe higher dosage and less quantity will prevent coverage denial.    Adequate: hears normal conversation without difficulty

## 2020-09-22 ENCOUNTER — OUTPATIENT (OUTPATIENT)
Dept: OUTPATIENT SERVICES | Facility: HOSPITAL | Age: 50
LOS: 1 days | Discharge: HOME | End: 2020-09-22
Payer: COMMERCIAL

## 2020-09-22 DIAGNOSIS — Z98.890 OTHER SPECIFIED POSTPROCEDURAL STATES: Chronic | ICD-10-CM

## 2020-09-22 DIAGNOSIS — Z90.49 ACQUIRED ABSENCE OF OTHER SPECIFIED PARTS OF DIGESTIVE TRACT: Chronic | ICD-10-CM

## 2020-09-22 DIAGNOSIS — Z12.31 ENCOUNTER FOR SCREENING MAMMOGRAM FOR MALIGNANT NEOPLASM OF BREAST: ICD-10-CM

## 2020-09-22 PROBLEM — H18.51 ENDOTHELIAL CORNEAL DYSTROPHY: Chronic | Status: ACTIVE | Noted: 2019-12-13

## 2020-09-22 PROBLEM — D25.9 LEIOMYOMA OF UTERUS, UNSPECIFIED: Chronic | Status: ACTIVE | Noted: 2019-12-13

## 2020-09-22 PROBLEM — N87.9 DYSPLASIA OF CERVIX UTERI, UNSPECIFIED: Chronic | Status: ACTIVE | Noted: 2019-12-13

## 2020-09-22 PROBLEM — M54.9 DORSALGIA, UNSPECIFIED: Chronic | Status: ACTIVE | Noted: 2019-12-13

## 2020-09-22 PROCEDURE — 77063 BREAST TOMOSYNTHESIS BI: CPT | Mod: 26

## 2020-09-22 PROCEDURE — 77067 SCR MAMMO BI INCL CAD: CPT | Mod: 26

## 2020-10-05 ENCOUNTER — OUTPATIENT (OUTPATIENT)
Dept: OUTPATIENT SERVICES | Facility: HOSPITAL | Age: 50
LOS: 1 days | Discharge: HOME | End: 2020-10-05
Payer: COMMERCIAL

## 2020-10-05 DIAGNOSIS — Z98.890 OTHER SPECIFIED POSTPROCEDURAL STATES: Chronic | ICD-10-CM

## 2020-10-05 DIAGNOSIS — Z90.49 ACQUIRED ABSENCE OF OTHER SPECIFIED PARTS OF DIGESTIVE TRACT: Chronic | ICD-10-CM

## 2020-10-05 DIAGNOSIS — R92.8 OTHER ABNORMAL AND INCONCLUSIVE FINDINGS ON DIAGNOSTIC IMAGING OF BREAST: ICD-10-CM

## 2020-10-05 PROCEDURE — 77065 DX MAMMO INCL CAD UNI: CPT | Mod: 26,RT

## 2020-10-05 PROCEDURE — G0279: CPT | Mod: 26

## 2020-10-05 PROCEDURE — 76642 ULTRASOUND BREAST LIMITED: CPT | Mod: 26,RT

## 2020-10-23 ENCOUNTER — OUTPATIENT (OUTPATIENT)
Dept: OUTPATIENT SERVICES | Facility: HOSPITAL | Age: 50
LOS: 1 days | Discharge: HOME | End: 2020-10-23
Payer: COMMERCIAL

## 2020-10-23 DIAGNOSIS — Z90.49 ACQUIRED ABSENCE OF OTHER SPECIFIED PARTS OF DIGESTIVE TRACT: Chronic | ICD-10-CM

## 2020-10-23 DIAGNOSIS — Z98.890 OTHER SPECIFIED POSTPROCEDURAL STATES: Chronic | ICD-10-CM

## 2020-10-23 DIAGNOSIS — R53.83 OTHER FATIGUE: ICD-10-CM

## 2020-10-23 PROCEDURE — 71260 CT THORAX DX C+: CPT | Mod: 26

## 2020-11-24 NOTE — H&P PST ADULT - WEIGHT IN LBS
1.  Always get help lifting 50# or more.     2. Vaginal health:  Vaginal atrophy (dryness):  Use 0.5 gram of estrogen cream in vagina  twice a week    3. H/o constipation:  Continue to avoid straining.  Continue stool softeners/fiber.          4. mammogram is due 05/2021   5. RTC 1year for follow up   164.9

## 2021-01-28 ENCOUNTER — OUTPATIENT (OUTPATIENT)
Dept: OUTPATIENT SERVICES | Facility: HOSPITAL | Age: 51
LOS: 1 days | Discharge: HOME | End: 2021-01-28

## 2021-01-28 DIAGNOSIS — Z98.890 OTHER SPECIFIED POSTPROCEDURAL STATES: Chronic | ICD-10-CM

## 2021-01-28 DIAGNOSIS — Z90.49 ACQUIRED ABSENCE OF OTHER SPECIFIED PARTS OF DIGESTIVE TRACT: Chronic | ICD-10-CM

## 2021-01-29 DIAGNOSIS — Z13.820 ENCOUNTER FOR SCREENING FOR OSTEOPOROSIS: ICD-10-CM

## 2021-01-29 DIAGNOSIS — N95.9 UNSPECIFIED MENOPAUSAL AND PERIMENOPAUSAL DISORDER: ICD-10-CM

## 2021-04-24 ENCOUNTER — TRANSCRIPTION ENCOUNTER (OUTPATIENT)
Age: 51
End: 2021-04-24

## 2021-08-10 NOTE — H&P PST ADULT - PRO PAIN EXPRESSION
MEDICAL ELIGIBILITY FORM    Name: Elisa Galloway YOB: 2010     Elisa is Medically eligible for all sports without restriction    Recommendations: N/A    I have examined the student named on this form and completed the preparticipation physical evaluation. The athlete does not have apparent clinical contraindications to practice and can participate in the sport(s) as outlined on this form. A copy of the physical examination findings are on record in my office and can be made available to the school at the request of the parents. If conditions arise after the athlete has been cleared for participation, the physician may rescind the medical eligibility until the problem is resolved and the potential consequences are completely explained to the athlete (and parents or guardians).    Name of health care professional (print or type): Laine Vitale MD Date: 8/10/2021     Address: [facility] [address] [address]  Dept: [phone]     Signature of health care professional:     SHARED EMERGENCY INFORMATION    No Known Allergies    No current outpatient medications     Other information:_____________________________________________________________________  _____________________________________________________________________________________  _____________________________________________________________________________________    Emergency contacts:___________________________________________________________________  _____________________________________________________________________________________  _____________________________________________________________________________________     © 2019 [place] [employer], American Academy of Pediatrics, American College of Sport Medicine, American Medical Society for Sports Medicine, American Orthopaedics Society for Sport Medicine, and American Osteopathic Academy of Sports Medicine.   Permission is granted to reprint for noncommercial, educational purposes with acknowledgement.      PHYSICAL EXAMINATION FORM     Name: Elisa Galloway YOB: 2010   PHYSICIAN REMINDERS  1. Consider additional questions on more-sensitive issues.  · Do you feel stressed out or under a lot of pressure?  · Do you feel sad, hopeless, depressed or anxious?  · Do you feel safe at your home or residence?  · During the past 30 days, did you use chewing tobacco, snuff or dip?  · Do you drink alcohol or user any other drugs?  · Have you even taken anabolic steroids or used any other performance-enhancing supplement?  · Have you even take any supplements to help you gain or lose weight or improve your performance?  · Do you wear a seat belt, use a helmet, and use condoms?  2.  Consider reviewing questions on cardiovascular symptoms (Q4-Q13 of History Form).    EXAMINATION     Vitals: /60 (BP Location: LUE - Left upper extremity, Patient Position: Sitting, Cuff Size: Large Adult)   Pulse 104   Temp 97.1 °F (36.2 °C) (Temporal)   Ht 5' 2.8\" (1.595 m)   Wt (!) 72.3 kg (159 lb 6.4 oz)   BMI 28.42 kg/m²   BSA 1.75 m²    Vision: R 20/20               L 20/20                      Corrected  Y         N     MEDICAL NORMAL ABNORMAL FINDINGS   Appearance  · Marfan stigmata (kyphoscoliosis, high-arched palate, pectus excavatum, arachnodactyly, arm span > height, hyperlaxity, myopia, MVP, aortic insufficiency) Yes    Eyes, ears, nose, throat  · Pupils equal  · Hearing Yes    Lymph nodes Yes    Heart*  · Murmurs (auscultation standing, auscultation supine, +/- Valsalva maneuver) Yes    Lungs Yes    Abdomen Yes    Skin  · Herpes simplex virus (HSV), lesions suggestive of methicillin-resistant Staphylococcus aureus MRSA, or tinea corporis Yes    MUSCULOSKELETAL NORMAL ABNORMAL FINDINGS   Neck Yes    Back Yes    Shoulder and arm Yes    Elbow and forearm Yes    Wrist, hand, and fingers Yes    Hip and thigh Yes    Knee Yes     Leg and ankle Yes    Foot and toes Yes    Functional  · Double-leg squat test, single-leg squat test, and box drop or step drop test Yes    * Consider electrocardiography ECG, echocardiogram, referral to cardiology for abnormal cardiac history or examination finding, or a combination of those.  Name of health care professional (print or type): Laine Vitale MD  Date: 8/10/2021  Address: [address] [address]  Dept: [phone]   Signature of health care professional:     © 2019 American Academy of Family Physicians, American Academy of Pediatrics, American College of Sport Medicine, American Medical Society for Sports Medicine, American Orthopaedics Society for Sport Medicine, and American Osteopathic Academy of Sports Medicine.  Permission is granted to reprint for noncommercial, educational purposes with acknowledgement.         Name: Elisa Galloway YOB: 2010   Supplemental COVID-19 questions     1.  Have you had any of the following symptoms in the past 14 days?           a) Fever or chills Yes  /  No          b) Cough Yes  /  No          c) Shortness of breath or difficulty breathing Yes  /  No          d) Fatigue Yes  /  No          e) Muscle or body aches Yes  /  No          f) Headache Yes  /  No          g) New loss of taste or smell Yes  /  No          h) Sore throat Yes  /  No          i) Congestion or runny nose Yes  /  No          j) Nausea or vomiting Yes  /  No          k) Diarrhea Yes  /  No          l) Date symptoms started _______          m) Date symptoms resolved _______   2.  Have you ever had a positive test for COVID-19? Yes  /  No          If yes _______                 i.  Date of test Yes  /  No                 ii. Were you tested because you had symptoms? Yes  /  No                 If yes:                         a) Date symptoms started _______                        b) Date symptoms resolved _______                         c) Were you hospitalized? Yes  /  No                        d) Did you have fever > 100.4 F.? Yes  /  No                               If yes, how many days did your fever last? _______                        e) Did you have muscle aches, chills, or lethargy? Yes  /  No                               If yes, how many days did these symptoms last? _______                        f) Have you had the vaccine? Yes  /  No                 iii. Were you tested because you were exposed to someone with COVID-19, but you did not have                     any symptoms? Yes  /  No   3. Has anyone living in your household had any of the following symptoms or tested positive for COVID-19 in the past 14 days? Yes  /  No          If Yes, [place] the applicable symptoms.     • Fever or chills • Shortness of breath or difficulty breathing    • Muscle or body aches • New loss of taste or smell    • Nausea or vomiting • Congestion or runny nose    • Sore throat           • Headache           • Cough • Fatigue           • Diarrhea       4. Have you been within 6 feet for more than 15 minutes of someone with COVID-19 in the past 14 days? Yes  /  No          If yes: date(s) of exposure _______   5. Are you currently waiting on results from a recent COVID test? Yes  /  No     Sources:  • Interim Guidance on the Preparticipation Physical Examination... : Clinical Journal of Sport Medicine (lww.com)  • Supplemental COVID19 Questions (lww.com)  • COVID19 Interim Guidance: Return to Sports and Physical Activity (aap.org)      HISTORY FORM  Note: Complete and sign this form (with your parents if younger than 18) before your appointment.  Name: Elisa Galloway YOB: 2010     Date of examination: 8/10/2021  Sport(s):_________________________________________   Sex assigned at birth: (F, M, or other): female How do you identify your gender?(F, M, or other):_________     List past and current medical  conditions:____________________________________________________________________  _______________________________________________________________________________________________________________    Have you ever had surgery? If yes, list all past surgical procedures: ______________________________________________  _______________________________________________________________________________________________________________    Medicines and supplements: List all current prescriptions, over-the-counter medicines, and supplements (herbal and nutritional):   ______________________________________________________________________________________________________________  ______________________________________________________________________________________________________________    Do you have any allergies? If yes, please list all your allergies (ie, medicines, pollens, food, stinging insects).   ______________________________________________________________________________________________________________  ______________________________________________________________________________________________________________       Patient Health Questionnaire Version 4 (PHQ-4)  Over the last 2 weeks, how often have you been bothered by any of the following problems? ([place] response.)   Not at all Several days Over half the days Nearly every day   Feeling nervous, anxious, or on edge 0 1 2 3   Not being able to stop or control worrying 0 1 2 3   Little interest or pleasure in doing things 0 1 2 3   Feeling down, depressed, or hopeless 0 1 2 3   (A sum of ?3 is considered positive on either subscale [questions 1 and 2, or questions 3 and 4] for screening purposes.)     GENERAL QUESTIONS  (Explain “Yes” answers at the end of this form.  [place] questions if you don’t know the answer.)     Yes     No   1. Do you have any concerns that you would like to discuss with your provider?       2. Has a provider ever denied or restricted your  participation in sports for any reason?       3. Do you have any ongoing medical issues or recent illness?       HEART HEALTH QUESTIONS ABOUT YOU Yes No   4. Have you ever passed out or nearly passed out during or after exercise?       5. Have you ever had discomfort, pain, tightness, or pressure in your chest during exercise?       6. Does your heart ever race, flutter in your chest, or skip beats (irregular beats) during exercise?       7. Has a doctor ever told you that you have any heart problems?       8. Has a doctor ever requested a test for your heart? For example, electrocardiography (ECG) or echocardiography.      HEART HEALTH QUESTIONS ABOUT YOU (CONTINUED ) Yes No   9. Do you get light-headed or feel shorter of breath than your friends during exercise?       10. Have you ever had a seizure?       HEART HEALTH QUESTIONS ABOUT YOUR FAMILY Yes No   11. Has any family member or relative  of heart problems or had an unexpected or unexplained sudden death before age 35 years (including drowning or unexplained car crash)?       12. Does anyone in your family have a genetic heart problem such as hypertrophic cardiomyopathy (HCM), Marfan syndrome, arrhythmogenic right ventricular cardiomyopathy (ARVC), long QT syndrome (LQTS), short QT syndrome (SQTS), Brugada syndrome, or catecholaminergic polymorphic ventricular tachycardia (CPVT)?       13. Has anyone in your family had a pacemaker or an implanted defibrillator before age 35?                Name: Elisa [facility] YOB: 2010     BONE AND JOINT QUESTIONS Yes No   14. Have you ever had a stress fracture or an injury to a bone, muscle, ligament, joint, or tendon that caused you to miss a practice or game?       15. Do you have a bone, muscle, ligament, or joint injury that bothers you?       MEDICAL QUESTIONS Yes No   16. Do you cough, wheeze, or have difficulty breathing during or after exercise?       17. Are you missing a kidney, an eye, a  testicle (males), your spleen, or any other organ?       18. Do you have groin or testicle pain or a painful bulge or hernia in the groin area?       19. Do you have any recurring skin rashes or rashes that come and go, including herpes or methicillin-resistant Staphylococcus aureus  (MRSA)?       20. Have you had a concussion or head injury that caused confusion, a prolonged headache, or memory problems?       21. Have you ever had numbness, had tingling, had weakness in your arms or legs, or been unable to move your arms or legs after being hit or falling?       22. Have you ever become ill while exercising in the heat?       23. Do you or does someone in your family have sickle cell trait or disease?       24. Have you ever had or do you have any problems with your eyes or vision?        MEDICAL QUESTIONS (CONTINUED ) Yes No   25. Do you worry about your weight?         26. Are you trying to or has anyone recommended that you gain or lose weight?       27. Are you on a special diet or do you avoid certain types of foods or food groups?       28. Have you ever had an eating disorder?       FEMALES ONLY     29. Have you ever had a menstrual period?       30. How old were you when you had your first menstrual period?       31. When was your most recent menstrual period?       32. How many periods have you had in the past 12 months?         Explain \"Yes\" answers here.  ______________________________________________    ______________________________________________    ______________________________________________    ______________________________________________    ______________________________________________    ______________________________________________    ______________________________________________    ______________________________________________     I hereby state that, to the best of my knowledge, my answers to the questions on this form are complete and correct.    Signature of athlete:  ____________________________________________________________________________________    Signature of parent or guardian: ___________________________________________________________________________  Date: ________________________________________________  _____________________________________________________________________________________________________  © 2019 American Academy of Family Physicians, American Academy of Pediatrics, American College of Sports Medicine, American Medical Society for Sports Medicine, American Orthopaedic Society for Sports Medicine, and American Osteopathic Academy of Sports Medicine. Permission is granted to reprint for noncommercial, educational purposes with acknowledgment.   none

## 2021-08-18 ENCOUNTER — NON-APPOINTMENT (OUTPATIENT)
Age: 51
End: 2021-08-18

## 2021-08-31 ENCOUNTER — APPOINTMENT (OUTPATIENT)
Dept: RHEUMATOLOGY | Facility: CLINIC | Age: 51
End: 2021-08-31
Payer: COMMERCIAL

## 2021-08-31 VITALS
OXYGEN SATURATION: 97 % | TEMPERATURE: 98 F | WEIGHT: 205 LBS | DIASTOLIC BLOOD PRESSURE: 78 MMHG | BODY MASS INDEX: 34.16 KG/M2 | HEIGHT: 65 IN | SYSTOLIC BLOOD PRESSURE: 124 MMHG | HEART RATE: 68 BPM

## 2021-08-31 DIAGNOSIS — Z82.49 FAMILY HISTORY OF ISCHEMIC HEART DISEASE AND OTHER DISEASES OF THE CIRCULATORY SYSTEM: ICD-10-CM

## 2021-08-31 DIAGNOSIS — Z78.9 OTHER SPECIFIED HEALTH STATUS: ICD-10-CM

## 2021-08-31 DIAGNOSIS — Z85.41 PERSONAL HISTORY OF MALIGNANT NEOPLASM OF CERVIX UTERI: ICD-10-CM

## 2021-08-31 DIAGNOSIS — Z80.1 FAMILY HISTORY OF MALIGNANT NEOPLASM OF TRACHEA, BRONCHUS AND LUNG: ICD-10-CM

## 2021-08-31 DIAGNOSIS — M79.7 FIBROMYALGIA: ICD-10-CM

## 2021-08-31 DIAGNOSIS — Z83.3 FAMILY HISTORY OF DIABETES MELLITUS: ICD-10-CM

## 2021-08-31 DIAGNOSIS — M70.61 TROCHANTERIC BURSITIS, RIGHT HIP: ICD-10-CM

## 2021-08-31 DIAGNOSIS — Z87.09 PERSONAL HISTORY OF OTHER DISEASES OF THE RESPIRATORY SYSTEM: ICD-10-CM

## 2021-08-31 DIAGNOSIS — M70.62 TROCHANTERIC BURSITIS, RIGHT HIP: ICD-10-CM

## 2021-08-31 PROCEDURE — 99205 OFFICE O/P NEW HI 60 MIN: CPT

## 2021-08-31 NOTE — PHYSICAL EXAM
[General Appearance - Alert] : alert [General Appearance - In No Acute Distress] : in no acute distress [Sclera] : the sclera and conjunctiva were normal [Extraocular Movements] : extraocular movements were intact [Outer Ear] : the ears and nose were normal in appearance [Neck Appearance] : the appearance of the neck was normal [Auscultation Breath Sounds / Voice Sounds] : lungs were clear to auscultation bilaterally [Heart Rate And Rhythm] : heart rate was normal and rhythm regular [Heart Sounds] : normal S1 and S2 [Bowel Sounds] : normal bowel sounds [Abdomen Soft] : soft [Abnormal Walk] : normal gait [Nail Clubbing] : no clubbing  or cyanosis of the fingernails [Involuntary Movements] : no involuntary movements were seen [Musculoskeletal - Swelling] : no joint swelling seen [Motor Tone] : muscle strength and tone were normal [] : no rash [Skin Lesions] : no skin lesions [Sensation] : the sensory exam was normal to light touch and pinprick [Motor Exam] : the motor exam was normal [No Focal Deficits] : no focal deficits [Oriented To Time, Place, And Person] : oriented to person, place, and time [Impaired Insight] : insight and judgment were intact [Affect] : the affect was normal [FreeTextEntry1] : Hips IR and ER minimal tenderness. Point tenderness lateral hips. no synovitis

## 2021-08-31 NOTE — ASSESSMENT
[FreeTextEntry1] : 51-year-old female here for evaluation of pain.\par \par \par Based on patient's symptomatology of forgetfulness, widespread pain, poor sleep, IBS, headaches, mood change, myalgias, no FH connective tissue disease; most likely suggest underlying fibromyalgia/chronic pain syndrome. I advised exercise 150 min/week, healthy eating, handout on anti-inflammatory diet given, PT, suggested she see behavioral health specialist and sleep medicine to rule out KATI. We also discussed medication for fibromyalgia but patient wishes to try natural remedies first. I have lower suspicion for connective tissue disease today and discussed signs or symptoms to watch for should they develop. She also likely has trochanteric bursitis I advised PT, NSAIDs and if needed, steroid injection with orthopedic surgery. She is to return on as needed basis should her symptoms fail to improve on her regimen. All questions answered and both patient and daughter verbalized understanding and agreement.

## 2021-08-31 NOTE — HISTORY OF PRESENT ILLNESS
[FreeTextEntry1] : 51-year-old female here for an evaluation of pain.\par \par Patient is with daughter today.  She reports over the past 1.5 years weight gain 50 pounds.\par States hips are painful radiating to bottom of feet. 12/10 in pain severity when it comes, nothing makes it better or worse. No AM stiffness in hips. \par Reports constant bloating, intermittent abdominal pains, constipation and has BM~3x/months. Saw GI and told she has IBS.\par Has On and off headaches and started menopause recently, reports forgetfulness last 6 months, depression and anxiety the last 6 months as well\par Joint pains in elbows, 2/10 in severity, described as sore, feels hot to touch\par has Poor sleep, trouble staying asleep, daytime feels like napping, wakes un refreshed\par Reports visual disturbances and plans to see ophto\par Intermittent with numbness left hand\par Feels that she worked with muscle aches and fatigue about 2-3x a week. \par Patient was feeling well prior to symptom onset\par

## 2021-10-14 ENCOUNTER — OUTPATIENT (OUTPATIENT)
Dept: OUTPATIENT SERVICES | Facility: HOSPITAL | Age: 51
LOS: 1 days | Discharge: HOME | End: 2021-10-14
Payer: COMMERCIAL

## 2021-10-14 DIAGNOSIS — Z98.890 OTHER SPECIFIED POSTPROCEDURAL STATES: Chronic | ICD-10-CM

## 2021-10-14 DIAGNOSIS — E78.00 PURE HYPERCHOLESTEROLEMIA, UNSPECIFIED: ICD-10-CM

## 2021-10-14 DIAGNOSIS — Z90.49 ACQUIRED ABSENCE OF OTHER SPECIFIED PARTS OF DIGESTIVE TRACT: Chronic | ICD-10-CM

## 2021-10-14 PROCEDURE — 93880 EXTRACRANIAL BILAT STUDY: CPT | Mod: 26

## 2021-11-02 ENCOUNTER — OUTPATIENT (OUTPATIENT)
Dept: OUTPATIENT SERVICES | Facility: HOSPITAL | Age: 51
LOS: 1 days | Discharge: HOME | End: 2021-11-02
Payer: COMMERCIAL

## 2021-11-02 DIAGNOSIS — Z98.890 OTHER SPECIFIED POSTPROCEDURAL STATES: Chronic | ICD-10-CM

## 2021-11-02 DIAGNOSIS — Z12.31 ENCOUNTER FOR SCREENING MAMMOGRAM FOR MALIGNANT NEOPLASM OF BREAST: ICD-10-CM

## 2021-11-02 DIAGNOSIS — Z90.49 ACQUIRED ABSENCE OF OTHER SPECIFIED PARTS OF DIGESTIVE TRACT: Chronic | ICD-10-CM

## 2021-11-02 PROCEDURE — 77063 BREAST TOMOSYNTHESIS BI: CPT | Mod: 26

## 2021-11-02 PROCEDURE — 77067 SCR MAMMO BI INCL CAD: CPT | Mod: 26

## 2022-05-03 ENCOUNTER — OUTPATIENT (OUTPATIENT)
Dept: OUTPATIENT SERVICES | Facility: HOSPITAL | Age: 52
LOS: 1 days | Discharge: HOME | End: 2022-05-03
Payer: COMMERCIAL

## 2022-05-03 DIAGNOSIS — Z90.49 ACQUIRED ABSENCE OF OTHER SPECIFIED PARTS OF DIGESTIVE TRACT: Chronic | ICD-10-CM

## 2022-05-03 DIAGNOSIS — Z98.890 OTHER SPECIFIED POSTPROCEDURAL STATES: Chronic | ICD-10-CM

## 2022-05-03 DIAGNOSIS — R79.89 OTHER SPECIFIED ABNORMAL FINDINGS OF BLOOD CHEMISTRY: ICD-10-CM

## 2022-05-03 PROCEDURE — 76536 US EXAM OF HEAD AND NECK: CPT | Mod: 26

## 2022-09-15 ENCOUNTER — EMERGENCY (EMERGENCY)
Facility: HOSPITAL | Age: 52
LOS: 0 days | Discharge: HOME | End: 2022-09-15
Attending: EMERGENCY MEDICINE | Admitting: EMERGENCY MEDICINE

## 2022-09-15 VITALS
OXYGEN SATURATION: 98 % | DIASTOLIC BLOOD PRESSURE: 73 MMHG | TEMPERATURE: 97 F | SYSTOLIC BLOOD PRESSURE: 120 MMHG | HEART RATE: 64 BPM | RESPIRATION RATE: 17 BRPM

## 2022-09-15 VITALS
HEIGHT: 65 IN | TEMPERATURE: 98 F | HEART RATE: 63 BPM | OXYGEN SATURATION: 98 % | DIASTOLIC BLOOD PRESSURE: 75 MMHG | SYSTOLIC BLOOD PRESSURE: 130 MMHG | RESPIRATION RATE: 17 BRPM | WEIGHT: 203.93 LBS

## 2022-09-15 DIAGNOSIS — R51.9 HEADACHE, UNSPECIFIED: ICD-10-CM

## 2022-09-15 DIAGNOSIS — E78.5 HYPERLIPIDEMIA, UNSPECIFIED: ICD-10-CM

## 2022-09-15 DIAGNOSIS — Z91.14 PATIENT'S OTHER NONCOMPLIANCE WITH MEDICATION REGIMEN: ICD-10-CM

## 2022-09-15 DIAGNOSIS — Z86.79 PERSONAL HISTORY OF OTHER DISEASES OF THE CIRCULATORY SYSTEM: ICD-10-CM

## 2022-09-15 DIAGNOSIS — Z90.49 ACQUIRED ABSENCE OF OTHER SPECIFIED PARTS OF DIGESTIVE TRACT: Chronic | ICD-10-CM

## 2022-09-15 DIAGNOSIS — J45.909 UNSPECIFIED ASTHMA, UNCOMPLICATED: ICD-10-CM

## 2022-09-15 DIAGNOSIS — Z86.73 PERSONAL HISTORY OF TRANSIENT ISCHEMIC ATTACK (TIA), AND CEREBRAL INFARCTION WITHOUT RESIDUAL DEFICITS: ICD-10-CM

## 2022-09-15 DIAGNOSIS — R11.2 NAUSEA WITH VOMITING, UNSPECIFIED: ICD-10-CM

## 2022-09-15 DIAGNOSIS — Z98.890 OTHER SPECIFIED POSTPROCEDURAL STATES: Chronic | ICD-10-CM

## 2022-09-15 DIAGNOSIS — Z87.19 PERSONAL HISTORY OF OTHER DISEASES OF THE DIGESTIVE SYSTEM: ICD-10-CM

## 2022-09-15 DIAGNOSIS — R11.0 NAUSEA: ICD-10-CM

## 2022-09-15 DIAGNOSIS — Z90.49 ACQUIRED ABSENCE OF OTHER SPECIFIED PARTS OF DIGESTIVE TRACT: ICD-10-CM

## 2022-09-15 DIAGNOSIS — Z87.891 PERSONAL HISTORY OF NICOTINE DEPENDENCE: ICD-10-CM

## 2022-09-15 DIAGNOSIS — R42 DIZZINESS AND GIDDINESS: ICD-10-CM

## 2022-09-15 DIAGNOSIS — Z87.42 PERSONAL HISTORY OF OTHER DISEASES OF THE FEMALE GENITAL TRACT: ICD-10-CM

## 2022-09-15 LAB
ALBUMIN SERPL ELPH-MCNC: 5.2 G/DL — SIGNIFICANT CHANGE UP (ref 3.5–5.2)
ALP SERPL-CCNC: 95 U/L — SIGNIFICANT CHANGE UP (ref 30–115)
ALT FLD-CCNC: 37 U/L — SIGNIFICANT CHANGE UP (ref 0–41)
ANION GAP SERPL CALC-SCNC: 11 MMOL/L — SIGNIFICANT CHANGE UP (ref 7–14)
AST SERPL-CCNC: 26 U/L — SIGNIFICANT CHANGE UP (ref 0–41)
BASOPHILS # BLD AUTO: 0.03 K/UL — SIGNIFICANT CHANGE UP (ref 0–0.2)
BASOPHILS NFR BLD AUTO: 0.4 % — SIGNIFICANT CHANGE UP (ref 0–1)
BILIRUB SERPL-MCNC: 0.7 MG/DL — SIGNIFICANT CHANGE UP (ref 0.2–1.2)
BUN SERPL-MCNC: 15 MG/DL — SIGNIFICANT CHANGE UP (ref 10–20)
CALCIUM SERPL-MCNC: 10.7 MG/DL — HIGH (ref 8.4–10.5)
CHLORIDE SERPL-SCNC: 106 MMOL/L — SIGNIFICANT CHANGE UP (ref 98–110)
CO2 SERPL-SCNC: 27 MMOL/L — SIGNIFICANT CHANGE UP (ref 17–32)
CREAT SERPL-MCNC: 0.9 MG/DL — SIGNIFICANT CHANGE UP (ref 0.7–1.5)
EGFR: 76 ML/MIN/1.73M2 — SIGNIFICANT CHANGE UP
EOSINOPHIL # BLD AUTO: 0.08 K/UL — SIGNIFICANT CHANGE UP (ref 0–0.7)
EOSINOPHIL NFR BLD AUTO: 1.1 % — SIGNIFICANT CHANGE UP (ref 0–8)
GLUCOSE SERPL-MCNC: 144 MG/DL — HIGH (ref 70–99)
HCT VFR BLD CALC: 42.7 % — SIGNIFICANT CHANGE UP (ref 37–47)
HGB BLD-MCNC: 14.8 G/DL — SIGNIFICANT CHANGE UP (ref 12–16)
IMM GRANULOCYTES NFR BLD AUTO: 0.3 % — SIGNIFICANT CHANGE UP (ref 0.1–0.3)
LYMPHOCYTES # BLD AUTO: 1.48 K/UL — SIGNIFICANT CHANGE UP (ref 1.2–3.4)
LYMPHOCYTES # BLD AUTO: 19.5 % — LOW (ref 20.5–51.1)
MCHC RBC-ENTMCNC: 31 PG — SIGNIFICANT CHANGE UP (ref 27–31)
MCHC RBC-ENTMCNC: 34.7 G/DL — SIGNIFICANT CHANGE UP (ref 32–37)
MCV RBC AUTO: 89.3 FL — SIGNIFICANT CHANGE UP (ref 81–99)
MONOCYTES # BLD AUTO: 0.57 K/UL — SIGNIFICANT CHANGE UP (ref 0.1–0.6)
MONOCYTES NFR BLD AUTO: 7.5 % — SIGNIFICANT CHANGE UP (ref 1.7–9.3)
NEUTROPHILS # BLD AUTO: 5.41 K/UL — SIGNIFICANT CHANGE UP (ref 1.4–6.5)
NEUTROPHILS NFR BLD AUTO: 71.2 % — SIGNIFICANT CHANGE UP (ref 42.2–75.2)
NRBC # BLD: 0 /100 WBCS — SIGNIFICANT CHANGE UP (ref 0–0)
PLATELET # BLD AUTO: 233 K/UL — SIGNIFICANT CHANGE UP (ref 130–400)
POTASSIUM SERPL-MCNC: 4.7 MMOL/L — SIGNIFICANT CHANGE UP (ref 3.5–5)
POTASSIUM SERPL-SCNC: 4.7 MMOL/L — SIGNIFICANT CHANGE UP (ref 3.5–5)
PROT SERPL-MCNC: 7.8 G/DL — SIGNIFICANT CHANGE UP (ref 6–8)
RBC # BLD: 4.78 M/UL — SIGNIFICANT CHANGE UP (ref 4.2–5.4)
RBC # FLD: 13.2 % — SIGNIFICANT CHANGE UP (ref 11.5–14.5)
SODIUM SERPL-SCNC: 144 MMOL/L — SIGNIFICANT CHANGE UP (ref 135–146)
TROPONIN T SERPL-MCNC: <0.01 NG/ML — SIGNIFICANT CHANGE UP
WBC # BLD: 7.59 K/UL — SIGNIFICANT CHANGE UP (ref 4.8–10.8)
WBC # FLD AUTO: 7.59 K/UL — SIGNIFICANT CHANGE UP (ref 4.8–10.8)

## 2022-09-15 PROCEDURE — 99285 EMERGENCY DEPT VISIT HI MDM: CPT

## 2022-09-15 PROCEDURE — 70498 CT ANGIOGRAPHY NECK: CPT | Mod: 26,MA

## 2022-09-15 PROCEDURE — 93010 ELECTROCARDIOGRAM REPORT: CPT

## 2022-09-15 PROCEDURE — 99282 EMERGENCY DEPT VISIT SF MDM: CPT

## 2022-09-15 PROCEDURE — 70450 CT HEAD/BRAIN W/O DYE: CPT | Mod: 26,MA,59

## 2022-09-15 PROCEDURE — 70496 CT ANGIOGRAPHY HEAD: CPT | Mod: 26,MA

## 2022-09-15 RX ORDER — METOCLOPRAMIDE HCL 10 MG
10 TABLET ORAL ONCE
Refills: 0 | Status: COMPLETED | OUTPATIENT
Start: 2022-09-15 | End: 2022-09-15

## 2022-09-15 RX ORDER — MECLIZINE HCL 12.5 MG
50 TABLET ORAL ONCE
Refills: 0 | Status: COMPLETED | OUTPATIENT
Start: 2022-09-15 | End: 2022-09-15

## 2022-09-15 RX ORDER — SODIUM CHLORIDE 9 MG/ML
1000 INJECTION, SOLUTION INTRAVENOUS ONCE
Refills: 0 | Status: COMPLETED | OUTPATIENT
Start: 2022-09-15 | End: 2022-09-15

## 2022-09-15 RX ORDER — ONDANSETRON 8 MG/1
4 TABLET, FILM COATED ORAL ONCE
Refills: 0 | Status: COMPLETED | OUTPATIENT
Start: 2022-09-15 | End: 2022-09-15

## 2022-09-15 RX ADMIN — Medication 50 MILLIGRAM(S): at 08:59

## 2022-09-15 RX ADMIN — ONDANSETRON 4 MILLIGRAM(S): 8 TABLET, FILM COATED ORAL at 10:35

## 2022-09-15 RX ADMIN — SODIUM CHLORIDE 1000 MILLILITER(S): 9 INJECTION, SOLUTION INTRAVENOUS at 08:27

## 2022-09-15 RX ADMIN — SODIUM CHLORIDE 1000 MILLILITER(S): 9 INJECTION, SOLUTION INTRAVENOUS at 10:35

## 2022-09-15 RX ADMIN — Medication 10 MILLIGRAM(S): at 08:27

## 2022-09-15 NOTE — ED PROVIDER NOTE - PATIENT PORTAL LINK FT
You can access the FollowMyHealth Patient Portal offered by [facility] by registering at the following website: [email or web address]. By joining [employer]’s FollowMyHealth portal, you will also be able to view your health information using other applications (apps) compatible with our system.

## 2022-09-15 NOTE — ED PROVIDER NOTE - CARE PROVIDER_API CALL
Ishan Mcfadden)  Neurology  [address] [place] [address]  Phone: [phone]  Fax: [phone]  Follow Up Time: 1-3 Days

## 2022-09-15 NOTE — ED PROVIDER NOTE - OBJECTIVE STATEMENT
51 yo female, PMHx of carotid stenosis (50% b/l) non-compliant with statin, presents with sudden onset frontal head pressure at 2 am, non-radiating, worse with movement, no alleviating factors, associated with "difficulty focusing" and nausea/vomiting. Denies chest pain, shortness of breath, abdominal pain, recent illness, fevers, chills. Post-menopausal.

## 2022-09-15 NOTE — ED PROVIDER NOTE - NS ED ROS FT
GEN:  no fever, no chills, no generalized weakness  NEURO:  +headache, no dizziness  ENT: no sore throat, no runny nose  CV:  no chest pain, no palpitations  RESP:  no sob, no cough  GI:  +nausea, +vomiting, no abdominal pain, no diarrhea  :  no dysuria, no urinary frequency, no hematuria  MSK:  no joint pain, no edema  SKIN:  no rash, no bruising

## 2022-09-15 NOTE — ED PROVIDER NOTE - CARE PLAN
Assessment and plan of treatment:	plan - labs, ct / cta head and neck, antiemetics, reassess   Principal Discharge DX:	Headache  Assessment and plan of treatment:	plan - labs, ct / cta head and neck, antiemetics, reassess   1

## 2022-09-15 NOTE — CONSULT NOTE ADULT - SUBJECTIVE AND OBJECTIVE BOX
Neurology Consult Note    HPI:  Pt is a 51yo female w/ PHMx of carotid stenosis (50% b/l) non-compliant with stating, presenting after having "headache" and nausea. She reports she had woken up around 2am without any issues (walked around, gone up and down stairs). Around 2:45am, she was lying down and unable to sleep and had sudden onset head pressure. She states it wasn't really a headaches; pain was 3/10; felt like someone squeezing top of her head. She reports she gets anxious often and believes it may have worsened the situation. She denies any blurriness/changes in vision, hearing loss or ringing, numbness or tingling. She endorses nausea; she reports she induced herself to vomit 4 times thinking it would help get over the sensation of nausea. She denies any alc, marijuana or recreational drugs. She reports taking a number of vitamins and supplements: quercetin, vit C, zinc, vit b, oregano oil, blackseed oil.    At bedside, pt reports she no longer has any symptoms and is ready to get out of here.        PAST MEDICAL & SURGICAL HISTORY:  Asthma    Fuchs&#x27; corneal dystrophy    Fibroid uterus    Cervical dysplasia  s/p leep    Chronic neck and back pain    History of hernia repair    History of cholecystectomy    H/O LEEP            Medications:      Vital Signs Last 24 Hrs  T(C): 36.4 (15 Sep 2022 06:57), Max: 36.4 (15 Sep 2022 06:57)  T(F): 97.5 (15 Sep 2022 06:57), Max: 97.5 (15 Sep 2022 06:57)  HR: 63 (15 Sep 2022 06:57) (63 - 63)  BP: 130/75 (15 Sep 2022 06:57) (130/75 - 130/75)  BP(mean): --  RR: 17 (15 Sep 2022 06:57) (17 - 17)  SpO2: 98% (15 Sep 2022 06:57) (98% - 98%)    Parameters below as of 15 Sep 2022 06:57  Patient On (Oxygen Delivery Method): room air        Neurological Exam:   Mental status: Awake, alert and oriented x3.  Recent and remote memory intact.  Naming, repetition and comprehension intact.  Attention/concentration intact.  No dysarthria, no aphasia.  Fund of knowledge appropriate.    Cranial nerves: Pupils equally round and reactive to light, visual fields full, no nystagmus, extraocular muscles intact, V1 through V3 intact bilaterally and symmetric, face symmetric, hearing intact to finger rub, palate elevation symmetric, tongue was midline.  Motor:  MRC grading 5/5 b/l UE/LE.   strength 5/5.  Normal tone and bulk.  No abnormal movements.    Sensation: Intact to light touch, proprioception, and pinprick.   Coordination: No dysmetria on finger-to-nose and heel-to-shin.  No dysdiadokinesia.  Reflexes: 2+ in bilateral UE/LE, downgoing toes bilaterally.    Labs:  CBC Full  -  ( 15 Sep 2022 08:40 )  WBC Count : 7.59 K/uL  RBC Count : 4.78 M/uL  Hemoglobin : 14.8 g/dL  Hematocrit : 42.7 %  Platelet Count - Automated : 233 K/uL  Mean Cell Volume : 89.3 fL  Mean Cell Hemoglobin : 31.0 pg  Mean Cell Hemoglobin Concentration : 34.7 g/dL  Auto Neutrophil # : 5.41 K/uL  Auto Lymphocyte # : 1.48 K/uL  Auto Monocyte # : 0.57 K/uL  Auto Eosinophil # : 0.08 K/uL  Auto Basophil # : 0.03 K/uL  Auto Neutrophil % : 71.2 %  Auto Lymphocyte % : 19.5 %  Auto Monocyte % : 7.5 %  Auto Eosinophil % : 1.1 %  Auto Basophil % : 0.4 %    09-15    144  |  106  |  15  ----------------------------<  144<H>  4.7   |  27  |  0.9    Ca    10.7<H>      15 Sep 2022 08:40    TPro  7.8  /  Alb  5.2  /  TBili  0.7  /  DBili  x   /  AST  26  /  ALT  37  /  AlkPhos  95  09-15    LIVER FUNCTIONS - ( 15 Sep 2022 08:40 )  Alb: 5.2 g/dL / Pro: 7.8 g/dL / ALK PHOS: 95 U/L / ALT: 37 U/L / AST: 26 U/L / GGT: x

## 2022-09-15 NOTE — ED PROVIDER NOTE - ATTENDING CONTRIBUTION TO CARE
51 yo F PMHx carotid stenosis (50 percent bilaterally), TIA, HLD non-compliant w/ meds presents to ED w/ frontal headache since 2am this morning assosicated with feeling off balance when walking and dizziness worse w/ movement. Pt reports sx associated with nausea and nbnb vomiting.     No fever,chest pain, sob, palpitations, diaphoresis, cough, numbness/tingling, neck pain/stiffness, abdominal pain, diarrhea, constipation, urinary symptoms, trauma, weakness, edema, calf pain or swelling, sick contacts, recent travel or rash.     Vital Signs: I have reviewed the initial vital signs.  Constitutional: uncomfortable in stretcher.   Integumentary: No rash.  ENT: MMM  NECK: Supple.   Cardiovascular: RRR, radial pulses 2/4 bilaterally.   Respiratory: Breath sounds CTA b/l, no wheezing or crackles, good air exchange, good resp effort and excursion, no accessory muscle use, no stridor.  Gastrointestinal: Abdomen soft, non-tender, non-distended, no rebound tenderness or guarding, no CVAT.   Musculoskeletal: FROM, no edema, no calf pain/swelling/erythema.  Neurologic: AOx3, CN 2-12 grossly intact. +5/5 strength and sensation wnl in all extremities. No pronator drift, no dysarthria, no DM/DDK.

## 2022-09-15 NOTE — ED PROVIDER NOTE - PHYSICAL EXAMINATION
CONSTITUTIONAL: Well-developed; well-nourished; in no acute distress.   SKIN: warm, dry  HEAD: Normocephalic; atraumatic.  EYES: no conjunctival injection  ENT: No nasal discharge; airway clear.  NECK: Supple; non tender.  CARD: S1, S2 normal; Regular rate and rhythm.   RESP: No wheezes, rales or rhonchi.  ABD: soft ntnd  EXT: Normal ROM.  No clubbing, cyanosis or edema.   NEURO: Alert, oriented, grossly unremarkable. CN 2-12 grossly intact. Normal finger-to-nose. Equal strength and sensation UE and LE b/l. No dysarthria. NIHSS 0.  PSYCH: Cooperative, appropriate.

## 2022-09-15 NOTE — CONSULT NOTE ADULT - ATTENDING COMMENTS
I have personally seen and examined this patient on 9/15.  I have fully participated in the care of this patient.  I have reviewed all pertinent clinical information, including history, physical exam, plan and note. Patient with known history of carotid stenosis presents with self limited episode of dizziness and pressure like headache in the frontal region. Exam is unremarkable. No evidence of meningeal sign. No visual deficit and exam shows intact motor and sensory. Headache resolved. CT head showed possible age indeterminant infarct. No clinical correlation. Recommended to take ASA 81 and Statin. Follow up in outpatient setting for Brain MRI w/wo. Educated to not to take unnecessary supplements  I have reviewed all pertinent clinical information and reviewed all relevant imaging and diagnostic studies personally.  Recommendations as above.  Agree with above assessment except as noted.
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
## 2022-09-15 NOTE — ED PROVIDER NOTE - PROGRESS NOTE DETAILS
Authored by Charu Aguirre DO: Pt reports resolution of sx, feeling much better at this time. Pending neuro eval and recs for age-indeterminate infarct on CT imaging. Signed out to Dr. Garcia at this time. CP: neurology consulted CP: neurology pending attending evaluation CP: per neurology consultation note, patient recommended outpatient follow up and MRI.

## 2022-09-15 NOTE — ED PROVIDER NOTE - NSFOLLOWUPINSTRUCTIONS_ED_ALL_ED_FT
Headache    A headache is pain or discomfort felt around the head or neck area. The specific cause of a headache may not be found as there are many types including tension headaches, migraine headaches, and cluster headaches. Watch your condition for any changes. Things you can do to manage your pain include taking over the counter and prescription medications as instructed by your health care provider, lying down in a dark quiet room, limiting stress, getting regular sleep, and refraining from alcohol and tobacco products.    SEEK IMMEDIATE MEDICAL CARE IF YOU HAVE ANY OF THE FOLLOWING SYMPTOMS: fever, vomiting, stiff neck, loss of vision, problems with speech, muscle weakness, loss of balance, trouble walking, passing out, or confusion. Our Emergency Department Referral Coordinators will be reaching out ot you in the next 24-48 hours from 9:00am to 5:00pm (Monday to Friday) with a follow up appointment. Please expect a phone call from the hospital in that time frame. If you do not receive a call or if you have any questions or concerns, you can reach them at [phone] or [phone].        Headache    A headache is pain or discomfort felt around the head or neck area. The specific cause of a headache may not be found as there are many types including tension headaches, migraine headaches, and cluster headaches. Watch your condition for any changes. Things you can do to manage your pain include taking over the counter and prescription medications as instructed by your health care provider, lying down in a dark quiet room, limiting stress, getting regular sleep, and refraining from alcohol and tobacco products.    SEEK IMMEDIATE MEDICAL CARE IF YOU HAVE ANY OF THE FOLLOWING SYMPTOMS: fever, vomiting, stiff neck, loss of vision, problems with speech, muscle weakness, loss of balance, trouble walking, passing out, or confusion.

## 2022-09-15 NOTE — ED PROVIDER NOTE - CLINICAL SUMMARY MEDICAL DECISION MAKING FREE TEXT BOX
Pt with age indeterminate possible infarcts on ct, seen and cleared by neuro for outpt w/u.  pt dc to f/u with neuro

## 2022-09-15 NOTE — CONSULT NOTE ADULT - ASSESSMENT
Assessment:  This is a 52y Female w/ h/o carotid stenosis (50% b/l) non-complaints with statin and hx of self-reported anxiety (not on any treatment) who presented to ED after having head pressure and nausea.     Plan:  - < from: CT Angio Neck w/ IV Cont (09.15.22 @ 09:44) >CT HEAD: Punctate hypodensities in the left parietal white matter likely representing perivascular space, however age-indeterminate infarct can be considered in appropriate clinical setting. < end of copied text >  - no focal deficits on exam   Assessment:  This is a 52y Female w/ h/o carotid stenosis (50% b/l) non-complaints with statin and hx of self-reported anxiety (not on any treatment) who presented to ED after having head pressure and nausea.     Plan:  - < from: CT Angio Neck w/ IV Cont (09.15.22 @ 09:44) >CT HEAD: Punctate hypodensities in the left parietal white matter likely representing perivascular space, however age-indeterminate infarct can be considered in appropriate clinical setting. < end of copied text >  - no focal deficits on exam  - recommended pt to take aspirin and statin daily; avoid unnecessary supplements  - recommend outpatient follow up  - outpatient MRI   Assessment:  This is a 52y Female w/ h/o carotid stenosis (50% b/l) non-complaints with statin and hx of self-reported anxiety (not on any treatment) who presented to ED after having head pressure and nausea.     Plan:  - < from: CT Angio Neck w/ IV Cont (09.15.22 @ 09:44) >CT HEAD: Punctate hypodensities in the left parietal white matter likely representing perivascular space, however age-indeterminate infarct can be considered in appropriate clinical setting. < end of copied text >  - no focal deficits on exam  - recommended pt to take aspirin and statin daily; avoid unnecessary supplements  - recommend outpatient follow up  - outpatient MRI w/wo

## 2022-09-28 ENCOUNTER — OUTPATIENT (OUTPATIENT)
Dept: OUTPATIENT SERVICES | Facility: HOSPITAL | Age: 52
LOS: 1 days | Discharge: HOME | End: 2022-09-28

## 2022-09-28 DIAGNOSIS — Z98.890 OTHER SPECIFIED POSTPROCEDURAL STATES: Chronic | ICD-10-CM

## 2022-09-28 DIAGNOSIS — R42 DIZZINESS AND GIDDINESS: ICD-10-CM

## 2022-09-28 DIAGNOSIS — Z90.49 ACQUIRED ABSENCE OF OTHER SPECIFIED PARTS OF DIGESTIVE TRACT: Chronic | ICD-10-CM

## 2022-09-28 PROCEDURE — 70553 MRI BRAIN STEM W/O & W/DYE: CPT | Mod: 26

## 2022-10-02 ENCOUNTER — EMERGENCY (EMERGENCY)
Facility: HOSPITAL | Age: 52
LOS: 1 days | Discharge: HOME | End: 2022-10-04
Attending: EMERGENCY MEDICINE | Admitting: STUDENT IN AN ORGANIZED HEALTH CARE EDUCATION/TRAINING PROGRAM
Payer: COMMERCIAL

## 2022-10-02 VITALS
SYSTOLIC BLOOD PRESSURE: 106 MMHG | WEIGHT: 199.96 LBS | TEMPERATURE: 98 F | OXYGEN SATURATION: 100 % | HEART RATE: 98 BPM | RESPIRATION RATE: 18 BRPM | DIASTOLIC BLOOD PRESSURE: 64 MMHG | HEIGHT: 65 IN

## 2022-10-02 DIAGNOSIS — R55 SYNCOPE AND COLLAPSE: ICD-10-CM

## 2022-10-02 DIAGNOSIS — Z98.890 OTHER SPECIFIED POSTPROCEDURAL STATES: Chronic | ICD-10-CM

## 2022-10-02 DIAGNOSIS — I10 ESSENTIAL (PRIMARY) HYPERTENSION: ICD-10-CM

## 2022-10-02 DIAGNOSIS — E78.00 PURE HYPERCHOLESTEROLEMIA, UNSPECIFIED: ICD-10-CM

## 2022-10-02 DIAGNOSIS — Z90.89 ACQUIRED ABSENCE OF OTHER ORGANS: ICD-10-CM

## 2022-10-02 DIAGNOSIS — Z90.49 ACQUIRED ABSENCE OF OTHER SPECIFIED PARTS OF DIGESTIVE TRACT: Chronic | ICD-10-CM

## 2022-10-02 DIAGNOSIS — U07.1 COVID-19: ICD-10-CM

## 2022-10-02 DIAGNOSIS — R51.9 HEADACHE, UNSPECIFIED: ICD-10-CM
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
## 2022-10-02 DIAGNOSIS — Z82.49 FAMILY HISTORY OF ISCHEMIC HEART DISEASE AND OTHER DISEASES OF THE CIRCULATORY SYSTEM: ICD-10-CM

## 2022-10-02 DIAGNOSIS — R53.83 OTHER FATIGUE: ICD-10-CM

## 2022-10-02 DIAGNOSIS — J45.909 UNSPECIFIED ASTHMA, UNCOMPLICATED: ICD-10-CM

## 2022-10-02 LAB
ALBUMIN SERPL ELPH-MCNC: 4.7 G/DL — SIGNIFICANT CHANGE UP (ref 3.5–5.2)
ALP SERPL-CCNC: 86 U/L — SIGNIFICANT CHANGE UP (ref 30–115)
ALT FLD-CCNC: 39 U/L — SIGNIFICANT CHANGE UP (ref 0–41)
ANION GAP SERPL CALC-SCNC: 14 MMOL/L — SIGNIFICANT CHANGE UP (ref 7–14)
AST SERPL-CCNC: 32 U/L — SIGNIFICANT CHANGE UP (ref 0–41)
BASOPHILS # BLD AUTO: 0.01 K/UL — SIGNIFICANT CHANGE UP (ref 0–0.2)
BASOPHILS NFR BLD AUTO: 0.2 % — SIGNIFICANT CHANGE UP (ref 0–1)
BILIRUB SERPL-MCNC: 0.3 MG/DL — SIGNIFICANT CHANGE UP (ref 0.2–1.2)
BUN SERPL-MCNC: 10 MG/DL — SIGNIFICANT CHANGE UP (ref 10–20)
CALCIUM SERPL-MCNC: 9.5 MG/DL — SIGNIFICANT CHANGE UP (ref 8.4–10.4)
CHLORIDE SERPL-SCNC: 102 MMOL/L — SIGNIFICANT CHANGE UP (ref 98–110)
CO2 SERPL-SCNC: 22 MMOL/L — SIGNIFICANT CHANGE UP (ref 17–32)
CREAT SERPL-MCNC: 0.9 MG/DL — SIGNIFICANT CHANGE UP (ref 0.7–1.5)
EGFR: 76 ML/MIN/1.73M2 — SIGNIFICANT CHANGE UP
EOSINOPHIL # BLD AUTO: 0 K/UL — SIGNIFICANT CHANGE UP (ref 0–0.7)
EOSINOPHIL NFR BLD AUTO: 0 % — SIGNIFICANT CHANGE UP (ref 0–8)
GLUCOSE SERPL-MCNC: 144 MG/DL — HIGH (ref 70–99)
HCT VFR BLD CALC: 44.2 % — SIGNIFICANT CHANGE UP (ref 37–47)
HGB BLD-MCNC: 15.3 G/DL — SIGNIFICANT CHANGE UP (ref 12–16)
IMM GRANULOCYTES NFR BLD AUTO: 0.2 % — SIGNIFICANT CHANGE UP (ref 0.1–0.3)
LYMPHOCYTES # BLD AUTO: 0.73 K/UL — LOW (ref 1.2–3.4)
LYMPHOCYTES # BLD AUTO: 13.5 % — LOW (ref 20.5–51.1)
MAGNESIUM SERPL-MCNC: 2 MG/DL — SIGNIFICANT CHANGE UP (ref 1.8–2.4)
MCHC RBC-ENTMCNC: 31.3 PG — HIGH (ref 27–31)
MCHC RBC-ENTMCNC: 34.6 G/DL — SIGNIFICANT CHANGE UP (ref 32–37)
MCV RBC AUTO: 90.4 FL — SIGNIFICANT CHANGE UP (ref 81–99)
MONOCYTES # BLD AUTO: 0.26 K/UL — SIGNIFICANT CHANGE UP (ref 0.1–0.6)
MONOCYTES NFR BLD AUTO: 4.8 % — SIGNIFICANT CHANGE UP (ref 1.7–9.3)
NEUTROPHILS # BLD AUTO: 4.39 K/UL — SIGNIFICANT CHANGE UP (ref 1.4–6.5)
NEUTROPHILS NFR BLD AUTO: 81.3 % — HIGH (ref 42.2–75.2)
NRBC # BLD: 0 /100 WBCS — SIGNIFICANT CHANGE UP (ref 0–0)
PLATELET # BLD AUTO: 161 K/UL — SIGNIFICANT CHANGE UP (ref 130–400)
POTASSIUM SERPL-MCNC: 4.1 MMOL/L — SIGNIFICANT CHANGE UP (ref 3.5–5)
POTASSIUM SERPL-SCNC: 4.1 MMOL/L — SIGNIFICANT CHANGE UP (ref 3.5–5)
PROT SERPL-MCNC: 7.1 G/DL — SIGNIFICANT CHANGE UP (ref 6–8)
RBC # BLD: 4.89 M/UL — SIGNIFICANT CHANGE UP (ref 4.2–5.4)
RBC # FLD: 13.2 % — SIGNIFICANT CHANGE UP (ref 11.5–14.5)
SARS-COV-2 RNA SPEC QL NAA+PROBE: DETECTED
SODIUM SERPL-SCNC: 138 MMOL/L — SIGNIFICANT CHANGE UP (ref 135–146)
TROPONIN T SERPL-MCNC: <0.01 NG/ML — SIGNIFICANT CHANGE UP
TROPONIN T SERPL-MCNC: <0.01 NG/ML — SIGNIFICANT CHANGE UP
WBC # BLD: 5.4 K/UL — SIGNIFICANT CHANGE UP (ref 4.8–10.8)
WBC # FLD AUTO: 5.4 K/UL — SIGNIFICANT CHANGE UP (ref 4.8–10.8)

## 2022-10-02 PROCEDURE — 93010 ELECTROCARDIOGRAM REPORT: CPT

## 2022-10-02 PROCEDURE — 71046 X-RAY EXAM CHEST 2 VIEWS: CPT | Mod: 26

## 2022-10-02 PROCEDURE — 93010 ELECTROCARDIOGRAM REPORT: CPT | Mod: 77

## 2022-10-02 PROCEDURE — 70450 CT HEAD/BRAIN W/O DYE: CPT | Mod: 26,59,MA

## 2022-10-02 PROCEDURE — 99285 EMERGENCY DEPT VISIT HI MDM: CPT

## 2022-10-02 RX ORDER — ACETAMINOPHEN 500 MG
650 TABLET ORAL ONCE
Refills: 0 | Status: COMPLETED | OUTPATIENT
Start: 2022-10-02 | End: 2022-10-02

## 2022-10-02 RX ORDER — SODIUM CHLORIDE 9 MG/ML
1000 INJECTION INTRAMUSCULAR; INTRAVENOUS; SUBCUTANEOUS ONCE
Refills: 0 | Status: COMPLETED | OUTPATIENT
Start: 2022-10-02 | End: 2022-10-02

## 2022-10-02 RX ORDER — ONDANSETRON 8 MG/1
4 TABLET, FILM COATED ORAL ONCE
Refills: 0 | Status: COMPLETED | OUTPATIENT
Start: 2022-10-02 | End: 2022-10-02

## 2022-10-02 RX ADMIN — SODIUM CHLORIDE 2000 MILLILITER(S): 9 INJECTION INTRAMUSCULAR; INTRAVENOUS; SUBCUTANEOUS at 14:41

## 2022-10-02 RX ADMIN — Medication 650 MILLIGRAM(S): at 14:41

## 2022-10-02 RX ADMIN — ONDANSETRON 4 MILLIGRAM(S): 8 TABLET, FILM COATED ORAL at 14:41

## 2022-10-02 RX ADMIN — Medication 650 MILLIGRAM(S): at 23:37

## 2022-10-02 NOTE — ED CDU PROVIDER INITIAL DAY NOTE - ATTENDING APP SHARED VISIT CONTRIBUTION OF CARE
52-year-old female with history of hypertension, dyslipidemia, carotid stenosis recent diagnosis of COVID presents for evaluation of syncopal event.  The patient starting high potatoes when she felt hot flushed and passed out.  Patient was placed in observation unit as EKG showed bigeminy.  Here patient was seen by electrophysiology who recommended echo and a Holter monitor.  Patient also, started on low-dose metoprolol.

## 2022-10-02 NOTE — ED PROVIDER NOTE - PROGRESS NOTE DETAILS
JOSEFINA -- patient reports nausea resolved after zofran, still feeling mild headache. CT results pending. Denies chest pain or SOB, satting 100% on RA, no increased work of breathing, speaking in full clear sentences JOSEFINA -- CTH and bloodwork WNL, trop negative. Repeat EKG noted frequent PVCs with bigeminy. Patient signed out to obs TELMA Cooley, pending EP consult. Patient reports feeling better, understands and agrees with plan to stay in obs.

## 2022-10-02 NOTE — ED ADULT TRIAGE NOTE - CHIEF COMPLAINT QUOTE
Pt c/o syncopal episode today. Pt was eating and hit head on counter. (+)HT/LOC. Denies (-)fall/AC use. Pt also (+)COVID-19 9/29.

## 2022-10-02 NOTE — ED ADULT NURSE REASSESSMENT NOTE - NS ED NURSE REASSESS COMMENT FT1
Assumed care of pt from previous RN, pt A&O4 cardiac monitor in place, no s/s of distress. Safety and comfort measures met. Updated on POC.

## 2022-10-02 NOTE — ED PROVIDER NOTE - NS ED ROS FT
Constitutional:  See HPI  Eyes:  No visual changes  ENMT: No neck pain or stiffness  Cardiac:  No chest pain  Respiratory:  No cough or respiratory distress.   GI:  No nausea, vomiting, diarrhea or abdominal pain.  :  No dysuria, frequency or burning.  MS:  No back pain.  Neuro:  No headache   Skin:  No skin rash Constitutional:  See HPI  Eyes:  No visual changes  ENMT: No neck pain or stiffness  Cardiac:  No chest pain  Respiratory:  No cough or respiratory distress.   GI:  +vomiting, +nausea, No diarrhea or abdominal pain.  :  No dysuria, frequency or burning.  MS:  No back pain.  Neuro:  +headache, no weakness, numbness or tingling  Skin:  No skin rash

## 2022-10-02 NOTE — ED PROVIDER NOTE - OBJECTIVE STATEMENT
52-year-old F, PMH HTN, COVID-positive x5 days, with fatigue, p/w syncope.  Patient was cooking as potatoes, took a bite which was very hot, tried to drink water to push food down, felt dizzy, and found self kneeling on floor.  + Diaphoresis.  + Hit head (against counter) No postictal phase, loss of continence, chest pain, neck pain or headache.  No prior syncope. No vomiting, diarrhea, abnormal bleeding, fever.  No leg pain or calf swelling.  No history of PE, MI, or stroke. 52-year-old F, PMH HTN, COVID-positive x5 days, with fatigue, p/w syncope.  Past 5 days, patient had intermittent fever (Tmax 101, well controlled with motrin) congestion, runny nose, but no cough. Today, 11:30am Patient was cooking as potatoes, took a bite which was very hot, tried to drink water to push food down, felt dizzy, and found self kneeling on floor.  + Diaphoresis.  + Hit head (against counter) No postictal phase, loss of continence, chest pain, neck pain. No prior syncope. After getting up, patient felt nauseous, had 2 episodes of vomiting, and developed a headache.      No diarrhea, abnormal bleeding, fever.  No leg pain or calf swelling.  No history of PE, MI, or stroke.

## 2022-10-02 NOTE — ED PROVIDER NOTE - CLINICAL SUMMARY MEDICAL DECISION MAKING FREE TEXT BOX
.    Agree w/ above.  51 yo y/o F w/ syncope.  Labs reviewed. Trop neg.  NL Cr.  Initial EKG quadrigeminy pattern, repeat, bigeminy, no ischemic changes.  No acute ED events.  Will place Pt in OBS for syncope in setting of diagnostic uncertainty.    .

## 2022-10-02 NOTE — ED CDU PROVIDER INITIAL DAY NOTE - NS ED ROS FT
Review of Systems    Constitutional: (-) fever, (-) chills  Eyes/ENT: (-) blurry vision, (-) epistaxis, (-) sore throat  Cardiovascular: (-) chest pain, (+) syncope  Respiratory: (-) cough, (-) shortness of breath  Gastrointestinal: (-) pain, (+) n/v, (-) diarrhea  Musculoskeletal: (-) neck pain, (-) back pain, (-) body aches  Integumentary: (-) rash, (-) edema  Neurological: (+) headache, (-) altered mental status  Psychiatric: (-) hallucinations  Allergic/Immunologic: (-) pruritus

## 2022-10-02 NOTE — ED PROVIDER NOTE - PHYSICAL EXAMINATION
Gen: NAD  Head: + Swelling to right parietal area  ENT: MMM  Eyes: NL inspection, PERRL, EOMI  Neck: supple, no cervical TTP  Cardio: RRR, no murmurs rubs or gallops  Pulm: No resp distress, CTA B  Abd: S/NT no R/G  Extrem: No pedal edema  Neuro: Cranial nerves WNL, no cerebellar signs, motor and sensory normal throughout.  Psyche: cooperative Gen: NAD  Head: + Swelling to right parietal area, no lac  ENT: MMM  Eyes: NL inspection, PERRL, EOMI  Neck: supple, no cervical TTP  Cardio: RRR, no murmurs rubs or gallops  Pulm: No resp distress, CTA B  Abd: S/NT no R/G  Extrem: No pedal edema  Neuro: Cranial nerves WNL, no cerebellar signs, motor and sensory normal throughout.  Psyche: cooperative

## 2022-10-02 NOTE — ED CDU PROVIDER INITIAL DAY NOTE - OBJECTIVE STATEMENT
POST- ANESTHESIA EVALUATION       Pt Name: Katelynn Caldera  MRN: [id number]  YOB: 1961  Date of evaluation: 6/16/2021  Time:  11:44 AM      /74   Pulse 59   Temp 98 °F (36.7 °C)   Resp 15   Ht 5' 6\" (1.676 m)   Wt 160 lb (72.6 kg)   SpO2 100%   BMI 25.82 kg/m²      Consciousness Level  Awake  Cardiopulmonary Status  Stable  Pain Adequately Treated YES  Nausea / Vomiting  NO  Adequate Hydration  YES  Anesthesia Related Complications NONE      Electronically signed by Sharon Earl MD on 6/16/2021 at 11:44 AM       Department of Anesthesiology  Postprocedure Note    Patient: Katelynn Caldera  MRN: [id number]  YOB: 1961  Date of evaluation: 6/16/2021  Time:  11:44 AM     Procedure Summary     Date: 06/16/21 Room / Location: [address] [place] [address] 04 / 250 [facility] ENDO    Anesthesia Start: 8890 Anesthesia Stop: 5133    Procedure: COLONOSCOPY WITH BIOPSY (N/A Anus) Diagnosis: (SERRATED ADENOMA OF COLON)    Surgeons: Cali Smith MD Responsible Provider: Sharon Earl MD    Anesthesia Type: MAC ASA Status: 2          Anesthesia Type: MAC    Jorge Phase I: Jorge Score: 10    Jorge Phase II:      Last vitals: Reviewed and per EMR flowsheets.        Anesthesia Post Evaluation
51 yo F c/o syncope. Patient states she after eating  mashed potatoes that were too hot, felt dizzy and had syncopal episode hitting her head. +HA and n/v after. No chest pains, SOB or abdominal pains. Patient currently has covid. Patient denies and smoking or drug use. Both parents have hx of lung cancer.

## 2022-10-02 NOTE — ED ADULT NURSE REASSESSMENT NOTE - NS ED NURSE REASSESS COMMENT FT1
Pt A&O4, remained on cardiac monitor, no s/s of distress, denies of pain or discomfort. Safety and comfort measures met, continue to monitor

## 2022-10-02 NOTE — ED CDU PROVIDER INITIAL DAY NOTE - NS ED ATTENDING STATEMENT MOD
This was a shared visit with the ELIANA. I reviewed and verified the documentation and independently performed the documented:

## 2022-10-02 NOTE — ED PROVIDER NOTE - ST/T WAVE
no T wave inversions, ST elevations or depressions possible U wave in leads 2, 3, avF, no ST elevations or depressions

## 2022-10-03 PROCEDURE — 99283 EMERGENCY DEPT VISIT LOW MDM: CPT

## 2022-10-03 PROCEDURE — 99218: CPT

## 2022-10-03 RX ORDER — ACETAMINOPHEN 500 MG
975 TABLET ORAL ONCE
Refills: 0 | Status: COMPLETED | OUTPATIENT
Start: 2022-10-03 | End: 2022-10-03

## 2022-10-03 RX ADMIN — Medication 650 MILLIGRAM(S): at 00:30

## 2022-10-03 RX ADMIN — Medication 975 MILLIGRAM(S): at 16:11

## 2022-10-03 RX ADMIN — Medication 975 MILLIGRAM(S): at 17:18

## 2022-10-03 NOTE — CONSULT NOTE ADULT - ASSESSMENT
IMPRESSION  Syncope likely orthostatic in the setting of decreased PO intake and COVID-19 infection  Frequent PVCs on EKG and Tele  Hx of HTN, HLD and carotid stenosis    RECOMMENDATIONS  check orthostatic VS  can check 2 D echo  may need holter/MCOT, can be done as outpatient to assess the burden of PVCs  can start low dose metoprolol tartarate 12.5 mg BID if orthostatic VS negative and SBP>100.  will discuss with attending

## 2022-10-03 NOTE — ED ADULT NURSE REASSESSMENT NOTE - NS ED NURSE REASSESS COMMENT FT1
Pt resting in bed w/ no complaints offered at this time, denies chest pain and  SOB, hourly rounding done, safety precautions maintained, bed alarm on. all needs attended at this time.

## 2022-10-03 NOTE — CONSULT NOTE ADULT - SUBJECTIVE AND OBJECTIVE BOX
HPI:  51 Y/O F with PMHx of HTN, HLD, carotid stenosis, recently diagnosed with COVID-19 infection 5 days ago presented to ER s/p syncope. Per pt she had COVID-19 infection about 5 days ago and has been feeling weak with associated fevers, runny nose, dry cough and generalized fatigue. She has not been eating properly for the last few days and has been feeling dehydrated as well. She tried to make breakfast and started eating hot potatoes yesterday however she felt the food was very hot and had to rush to the sink to get water. The next thing patient remembers is that she was on the floor with +trauma to the back of her head. Pt denied prior hx of similar events. She denied hx of post ictal confusion after the episode, no hx of urinary or fecal incontinence. She does endorse prior hx of palpitations however no chest pain or GONZALEZ. She follows up with cardiologist in [facility] and has stress test and echocardiogram in May which were both unremarkable.       PAST MEDICAL & SURGICAL HISTORY  Asthma    Fuchs&#x27; corneal dystrophy    Fibroid uterus    Cervical dysplasia  s/p leep    Chronic neck and back pain    History of hernia repair    History of cholecystectomy    H/O LEEP        FAMILY HISTORY:  FAMILY HISTORY:  Family history of coronary artery disease (Father)        SOCIAL HISTORY:  Social History:      ALLERGIES:  No Known Allergies      MEDICATIONS:    PRN:      HOME MEDICATIONS:  Home Medications:      VITALS:   T(F): 98.5 (10-03 @ 07:36), Max: 98.5 (10-03 @ 07:36)  HR: 67 (10-03 @ 07:36) (57 - 98)  BP: 103/58 (10-03 @ 07:36) (100/60 - 112/65)  BP(mean): --  RR: 18 (10-03 @ 07:36) (18 - 18)  SpO2: 99% (10-03 @ 07:36) (96% - 100%)    I&O's Summary      REVIEW OF SYSTEMS:  CONSTITUTIONAL: No weakness, fevers or chills  HEENT: No visual changes, neck/ear pain  RESPIRATORY: No cough, sob  CARDIOVASCULAR: See HPI  GASTROINTESTINAL: No abdominal pain. No nausea, vomiting, diarrhea   GENITOURINARY: No dysuria, frequency or hematuria  NEUROLOGICAL: see HPI  SKIN: No new rashes    PHYSICAL EXAM:  General: Not in distress.    HEENT: EOMI  Cardio: regular, S1, S2, no murmur  Pulm: B/L BS.  No wheezing / crackles / rales  Abdomen: Soft, non-tender, non-distended. Normoactive bowel sounds  Extremities: No edema b/l le  Neuro: A&O x3. No focal deficits    LABS:                        15.3   5.40  )-----------( 161      ( 02 Oct 2022 14:45 )             44.2     10-02    138  |  102  |  10  ----------------------------<  144<H>  4.1   |  22  |  0.9    Ca    9.5      02 Oct 2022 14:45  Mg     2.0     10-02    TPro  7.1  /  Alb  4.7  /  TBili  0.3  /  DBili  x   /  AST  32  /  ALT  39  /  AlkPhos  86  10-02      Troponin T, Serum: <0.01 ng/mL (10-02-22 @ 18:31)  Troponin T, Serum: <0.01 ng/mL (10-02-22 @ 14:45)    CARDIAC MARKERS ( 02 Oct 2022 18:31 )  x     / <0.01 ng/mL / x     / x     / x      CARDIAC MARKERS ( 02 Oct 2022 14:45 )  x     / <0.01 ng/mL / x     / x     / x            Troponin trend:        COVID-19 PCR: Detected (02 Oct 2022 14:45)      RADIOLOGY:  -CXR: < from: Xray Chest 2 Views PA/Lat (10.02.22 @ 16:27) >  IMPRESSION:    No radiographic evidence of acute cardiopulmonary disease.    < end of copied text >    -TTE: < from: Transthoracic Echocardiogram (19 @ 07:38) >  Summary:   1. Technically fair study.   2. Normal global left ventricular systolic function.   3. LV Ejection Fraction by Uribe's Method with a biplane EF of 54 %.   4. Increased LV wallthickness.   5. Normal left ventricular internal cavity size.   6. Normal right atrial size.   7. Mild thickening and calcification of the anterior and posterior   mitral valve leaflets.   8. No evidence of mitral valve regurgitation.    < end of copied text >    -CCTA: < from: CT Angio Neck w/ IV Cont (09.15.22 @ 09:44) >  No large vessel occlusion, aneurysm, or vascular malformation.    Mild atherosclerotic stenosis in the right cavernous ICA.    < end of copied text >    -STRESS TEST: N/A  -CATHETERIZATION: N/A  -OTHER:  EC Lead ECG:   Ventricular Rate 65 BPM    Atrial Rate 65 BPM    P-R Interval 166 ms    QRS Duration 84 ms    Q-T Interval 406 ms    QTC Calculation(Bazett) 422 ms    P Axis 59 degrees    R Axis 20 degrees    T Axis 53 degrees    Diagnosis Line Sinus rhythm with occasional Premature ventricular complexes  Otherwise normal ECG    Confirmed by Jaida Syed MD ([id number]) on 10/3/2022 7:52:27 AM (10-02 @ 18:31)      TELEMETRY EVENTS: Sinus with PVCs

## 2022-10-03 NOTE — ED ADULT NURSE REASSESSMENT NOTE - NS ED NURSE REASSESS COMMENT FT1
Patient is A&Ox4, fully functional and denies pain at this time. Left AC 18g has +flush with NO BR. Patient is under Observation for EP consult.

## 2022-10-04 VITALS
SYSTOLIC BLOOD PRESSURE: 131 MMHG | TEMPERATURE: 98 F | OXYGEN SATURATION: 94 % | HEART RATE: 73 BPM | DIASTOLIC BLOOD PRESSURE: 65 MMHG | RESPIRATION RATE: 18 BRPM

## 2022-10-04 PROCEDURE — 93306 TTE W/DOPPLER COMPLETE: CPT | Mod: 26

## 2022-10-04 PROCEDURE — 99217: CPT | Mod: 25

## 2022-10-04 RX ADMIN — Medication 650 MILLIGRAM(S): at 07:27

## 2022-10-04 RX ADMIN — Medication 1 TABLET(S): at 05:28

## 2022-10-04 NOTE — ED CDU PROVIDER DISPOSITION NOTE - CLINICAL COURSE
52-year-old woman was placed in CDU for work-up of syncope.  Of note, she is currently symptomatic from COVID-19.  She reports headache, poor p.o. intake over the last few days, fatigue, sleeping a lot.  No chest pain, shortness of breath.  Patient reports she was eating mashed potatoes and felt hot, when she felt lightheaded and fainted.  ED work-up was unremarkable except for positive COVID swab.  Troponins negative.  Patient is otherwise healthy, no cardiac risk factors.  Patient was monitored without incident in CDU, repeat EKG and enzymes unchanged.  Vital signs, exam as noted, patient is alert and nontoxic appearing on my eval.  Patient was evaluated by EP, who recommended echo.  Patient unable to go to the echo lab due to COVID positivity.  Bedside echo was unremarkable.  EF 58%, no significant valve disease.  Symptoms likely just due to poor p.o. intake and weakness associated with COVID-19.  Patient is feeling better after hydration, comfortable with discharge.  She will follow-up with EP or cardiology for event monitor and return to the ED for recurrent symptoms.

## 2022-10-04 NOTE — ED CDU PROVIDER SUBSEQUENT DAY NOTE - MEDICAL DECISION MAKING DETAILS
ep consult, tele monitor
52-year-old woman was placed in CDU for work-up of syncope.  Of note, she is currently symptomatic from COVID-19.  She reports headache, poor p.o. intake over the last few days, fatigue, sleeping a lot.  No chest pain, shortness of breath.  Patient reports she was eating mashed potatoes and felt hot, when she felt lightheaded and fainted.  ED work-up was unremarkable except for positive COVID swab.  Troponins negative.  Patient is otherwise healthy, no cardiac risk factors.  Patient was monitored without incident in CDU, repeat EKG and enzymes unchanged.  Vital signs, exam as noted, patient is alert and nontoxic appearing on my eval.  Patient was evaluated by EP, who recommended echo.  Patient unable to go to the echo lab due to COVID positivity.  Bedside echo was unremarkable.  EF 58%, no significant valve disease.  Symptoms likely just due to poor p.o. intake and weakness associated with COVID-19.  Patient is feeling better after hydration, comfortable with discharge.  She will follow-up with EP or cardiology for event monitor and return to the ED for recurrent symptoms.

## 2022-10-04 NOTE — ED ADULT NURSE REASSESSMENT NOTE - NS ED NURSE REASSESS COMMENT FT1
Pt noted sleeping throughout the night, no complaints offered at this time, hourly rounding done, safety precautions maintained, bed alarm on. all needs attended at this time.

## 2022-10-04 NOTE — ED CDU PROVIDER DISPOSITION NOTE - CARE PROVIDER_API CALL
Ye Merritt; ADOLPH)  [facility] [place] [address]  Phone: [phone]  Fax: [phone]  Follow Up Time:   
Ye Merritt; ADOLPH)  [place] [address]  Phone: [phone]  Fax: [phone]  Follow Up Time: Routine

## 2022-10-04 NOTE — ED CDU PROVIDER DISPOSITION NOTE - CARE PROVIDERS DIRECT ADDRESSES
[email or web address]
[email or web address]

## 2022-10-04 NOTE — ED CDU PROVIDER SUBSEQUENT DAY NOTE - ATTENDING APP SHARED VISIT CONTRIBUTION OF CARE
52-year-old woman was placed in CDU for work-up of syncope.  Of note, she is currently symptomatic from COVID-19.  She reports headache, poor p.o. intake over the last few days, fatigue, sleeping a lot.  No chest pain, shortness of breath.  Patient reports she was eating mashed potatoes and felt hot, when she felt lightheaded and fainted.  ED work-up was unremarkable except for positive COVID swab.  Troponins negative.  Patient is otherwise healthy, no cardiac risk factors.  Patient was monitored without incident in CDU, repeat EKG and enzymes unchanged.  Vital signs, exam as noted, patient is alert and nontoxic appearing on my eval.  Patient was evaluated by EP, who recommended echo.  Patient unable to go to the echo lab due to COVID positivity.  Bedside echo was unremarkable.  EF 58%, no significant valve disease.  Symptoms likely just due to poor p.o. intake and weakness associated with COVID-19.  Patient is feeling better after hydration, comfortable with discharge.  She will follow-up with EP or cardiology for event monitor and return to the ED for recurrent symptoms.
see initial day note

## 2022-10-04 NOTE — ED CDU PROVIDER DISPOSITION NOTE - NSFOLLOWUPINSTRUCTIONS_ED_ALL_ED_FT
Syncope    Syncope is when you temporarily lose consciousness, also called fainting or passing out. It is caused by a sudden decrease in blood flow to the brain. Even though most causes of syncope are not dangerous, syncope can be a sign of a serious medical problem. Signs that you may be about to faint include feeling dizzy, lightheaded, nauseas, visual changes, cold/clammy skin. Do not drive, use machinery, or play sports until your health care provider says it is okay.    SEEK IMMEDIATE MEDICAL CARE IF YOU HAVE THE FOLLOWING SYMPTOMS: severe headache, pain in your chest/abdomen/back, bleeding from your mouth or rectum, palpitations, shortness of breath, pain with breathing, seizure, confusion, trouble walking.
Syncope  is when you pass out (faint) for a short time. It is caused by a sudden decrease in blood flow to the brain. Signs that you may be about to pass out include:  Feeling dizzy or light-headed.  Feeling sick to your stomach (nauseous).  Seeing all white or all black.  Having cold, clammy skin.  If you pass out, get help right away. Call your local emergency services (911 in the [place] Do not drive yourself to the hospital.    Follow these instructions at home:  Watch for any changes in your symptoms. Take these actions to stay safe and help with your symptoms:    Lifestyle     Do not drive, use machinery, or play sports until your doctor says it is okay.  Do not drink alcohol.  Do not use any products that contain nicotine or tobacco, such as cigarettes and e-cigarettes. If you need help quitting, ask your doctor.  Drink enough fluid to keep your pee (urine) pale yellow.  General instructions     Take over-the-counter and prescription medicines only as told by your doctor.  If you are taking blood pressure or heart medicine, sit up and stand up slowly. Spend a few minutes getting ready to sit and then stand. This can help you feel less dizzy.  Have someone stay with you until you feel stable.  If you start to feel like you might pass out, lie down right away and raise (elevate) your feet above the level of your heart. Breathe deeply and steadily. Wait until all of the symptoms are gone.  Keep all follow-up visits as told by your doctor. This is important.  Get help right away if:  You have a very bad headache.  You pass out once or more than once.  You have pain in your chest, belly, or back.  You have a very fast or uneven heartbeat (palpitations).  It hurts to breathe.  You are bleeding from your mouth or your bottom (rectum).  You have black or tarry poop (stool).  You have jerky movements that you cannot control (seizure).  You are confused.  You have trouble walking.  You are very weak.  You have vision problems.  These symptoms may be an emergency. Do not wait to see if the symptoms will go away. Get medical help right away. Call your local emergency services (911 in the [place] Do not drive yourself to the hospital.     Summary  Syncope is when you pass out (faint) for a short time. It is caused by a sudden decrease in blood flow to the brain.  Signs that you may be about to faint include feeling dizzy, light-headed, or sick to your stomach, seeing all white or all black, or having cold, clammy skin.  If you start to feel like you might pass out, lie down right away and raise (elevate) your feet above the level of your heart. Breathe deeply and steadily. Wait until all of the symptoms are gone.  This information is not intended to replace advice given to you by your health care provider. Make sure you discuss any questions you have with your health care provider.

## 2022-10-12 ENCOUNTER — APPOINTMENT (OUTPATIENT)
Dept: NEUROLOGY | Facility: CLINIC | Age: 52
End: 2022-10-12

## 2022-10-12 VITALS
BODY MASS INDEX: 33.66 KG/M2 | WEIGHT: 202 LBS | SYSTOLIC BLOOD PRESSURE: 122 MMHG | HEART RATE: 69 BPM | OXYGEN SATURATION: 97 % | DIASTOLIC BLOOD PRESSURE: 78 MMHG | HEIGHT: 65 IN | TEMPERATURE: 97.8 F

## 2022-10-12 VITALS — HEART RATE: 76 BPM | SYSTOLIC BLOOD PRESSURE: 102 MMHG | DIASTOLIC BLOOD PRESSURE: 68 MMHG

## 2022-10-12 VITALS — DIASTOLIC BLOOD PRESSURE: 80 MMHG | HEART RATE: 67 BPM | SYSTOLIC BLOOD PRESSURE: 115 MMHG

## 2022-10-12 DIAGNOSIS — R55 SYNCOPE AND COLLAPSE: ICD-10-CM

## 2022-10-12 PROCEDURE — 99215 OFFICE O/P EST HI 40 MIN: CPT

## 2022-10-12 RX ORDER — ROSUVASTATIN CALCIUM 5 MG/1
5 TABLET, FILM COATED ORAL DAILY
Refills: 0 | Status: ACTIVE | COMMUNITY

## 2022-10-12 NOTE — ASSESSMENT
[FreeTextEntry1] : Patient is 51 yo RH woman with PMHx of HTN, COVID-positive x5 days, with fatigue who presents as HFU from 10/2/22 for syncope and 9/2022 admission for dizziness. The syncopal episode to me is likely vasovagal. Her dizziness episode in the AM was an isolated event and has not recurred since. Neuro exam is nonfocal today. Her neuro imaging have all been negative. STOPBANG 2\par \par PLAN: \par -Pending 30 day MCOT for PVCs and palpitations \par -Keep hydrated\par -F/u with me in 4 mo \par \par

## 2022-10-12 NOTE — HISTORY OF PRESENT ILLNESS
[FreeTextEntry1] : Patient is 53 yo RH woman with PMHx of HTN, COVID-positive x5 days, with fatigue who presents as HFU from 10/2/22 for syncope and 9/2022 admission for dizziness. \par \par She presented to ED in 10/2022 after she had pre-syncopal (dizziness, diaphoresis) sx while eating potatoes.\par \par She was eating potatoes that were very hot and it got stuck in her throat; she went to drink water to wash down the potatoes, and that was the last thing she remembered. She woke up on the floor. +LOC, was down for seconds. No recollection of how she fell, last memory was going to the sink to try to get water. When she woke up only had nausea, no tiredness or confusion. Denies having tongue lac, urination on floor, shaking, etc. \par \par Orthostatics positive from laying to standing in SBP today \par \par -TTE: EF 58% w no concerning findings; repeat with Cardiologist at [facility] was normal too \par -CTH w/ R scalp swelling but no acute intracranial pathology  \par -MRI H waw in 9/28/2022: NEG  \par -CTA H/N from 9/2022: Only mild atherosclerotic stenosis in the right cavernous ICA \par -Had no more episodes of presyncopal. Episode in 9/2022 she woke up nauseous and dizzy that improved with meds in the ED, total lasted a couple hours. Now has not more dizziness. It was an isolated episode. \par -Denies neck pain/stiffness. \par -STOP BANG 2\par -No seizure RF \par \par

## 2022-10-12 NOTE — PHYSICAL EXAM
[FreeTextEntry1] : Focal neurological exam:\par \par MS: Awake, alert, oriented to person, place, situation and time. Normal affect. Follows commands. \par \par Language: Speech is clear, fluent with good repetition & comprehension. No dysarthria. \par \par CNs 2 - 12 intact. EOMI no nystagmus, no diplopia. VFF. No facial asymmetry b/l, full eye closure strength b/l. Hearing grossly normal. Head turning & shoulder shrug intact b/l. Tongue midline, normal movements, no atrophy.\par \par Motor: Normal muscle bulk & tone. No noticeable tremor or seizure. No pronator drift. Muscle strength of b/l UE and b/l LE 5/5. \par \par Reflexes: DTR of biceps 1+, knees 1+  \par \par Sensation: Intact to LT, temperature and vibration b/l throughout\par \par Cortical: No extinction\par \par Coordination: No dysmetria to FTN.\par \par Gait: No postural instability. Normal stance and tandem gait.\par \par \par \par \par \par

## 2022-10-17 ENCOUNTER — OUTPATIENT (OUTPATIENT)
Dept: OUTPATIENT SERVICES | Facility: HOSPITAL | Age: 52
LOS: 1 days | Discharge: HOME | End: 2022-10-17

## 2022-10-17 DIAGNOSIS — Z98.890 OTHER SPECIFIED POSTPROCEDURAL STATES: Chronic | ICD-10-CM

## 2022-10-17 DIAGNOSIS — F17.211 NICOTINE DEPENDENCE, CIGARETTES, IN REMISSION: ICD-10-CM

## 2022-10-17 DIAGNOSIS — Z90.49 ACQUIRED ABSENCE OF OTHER SPECIFIED PARTS OF DIGESTIVE TRACT: Chronic | ICD-10-CM

## 2022-10-17 PROCEDURE — 71271 CT THORAX LUNG CANCER SCR C-: CPT | Mod: 26

## 2022-11-01 ENCOUNTER — APPOINTMENT (OUTPATIENT)
Dept: CARDIOLOGY | Facility: CLINIC | Age: 52
End: 2022-11-01

## 2022-11-18 ENCOUNTER — APPOINTMENT (OUTPATIENT)
Dept: CARDIOLOGY | Facility: CLINIC | Age: 52
End: 2022-11-18

## 2022-12-22 ENCOUNTER — OUTPATIENT (OUTPATIENT)
Dept: OUTPATIENT SERVICES | Facility: HOSPITAL | Age: 52
LOS: 1 days | Discharge: HOME | End: 2022-12-22

## 2022-12-22 DIAGNOSIS — Z90.49 ACQUIRED ABSENCE OF OTHER SPECIFIED PARTS OF DIGESTIVE TRACT: Chronic | ICD-10-CM

## 2022-12-22 DIAGNOSIS — Z98.890 OTHER SPECIFIED POSTPROCEDURAL STATES: Chronic | ICD-10-CM

## 2022-12-22 DIAGNOSIS — R10.11 RIGHT UPPER QUADRANT PAIN: ICD-10-CM

## 2022-12-22 PROCEDURE — 76857 US EXAM PELVIC LIMITED: CPT | Mod: 26

## 2022-12-22 PROCEDURE — 76700 US EXAM ABDOM COMPLETE: CPT | Mod: 26

## 2023-01-17 NOTE — ED ADULT NURSE NOTE - PAIN RATING/NUMBER SCALE (0-10): REST
OPERATIVE REPORT    DATE OF PROCEDURE: 01/17/23     PREOPERATIVE DIAGNOSIS:  Left breast cancer.    POSTOPERATIVE DIAGNOSIS:  Same as preoperative. Pathologic stage pending final histology.    PROCEDURE PERFORMED:  1. Injection of isosulfan blue to the Left breast.   2. Intraoperative lymphatic mapping.   3. Left axillary sentinel lymph node dissection with biopsy of deep axillary lymph nodes.  4. Bilateral total (simple)  mastectomy.    SURGEON: Soo Velásquez M.D.    FIRST ASSISTANT:  Laly Ellison SA, an assistant was needed for opening, closing, and retraction of tissue.      ANESTHESIA: General General    INTRAVENOUS FLUIDS:  See anesthesia record.    ESTIMATED BLOOD LOSS:  50cc.    FINDINGS:  1.  Surgical mastectomy margins appeared to be grossly free of disease.  2. The mastectomy flaps appeared viable at the end of the procedure.  3.  4 left sentinel nodes were negative      SPECIMEN(s):  1. Bilateral breast mastectomy; 4 left sentinel nodes      DRAIN(s)/CATHETER(s):  One 19F round drain in each of the mastectomy cavities.       COMPLICATIONS:  None.      INDICATIONS FOR PROCEDURE:  This patient is a 37 year old woman who presented with newly diagnosed breast cancer.      The indications and rationale along with the conduct of the planned surgical procedure were reviewed.  Risks, benefits, and alternatives were discussed.  The patient expressed understanding and wished to proceed.  She is brought to the operating room for a Bilateral total mastectomy as well as staging of the Left axilla by sentinel node dissection.    DETAILS OF PROCEDURE:  After verification of the correct surgical site in the preoperative holding area, the patient was brought to the operating room and placed in the supine position where General endotracheal anesthesia was begun.  [facility] timeout was performed.  IV antibiotic was administered.  Using sterile technique, 4cc of lymphazurin blue dye was injected in the  subareolar breast tissue and a 5 minute massage was performed.  The patient was prepped and draped in the usual sterile fashion.  A standard elliptical mastectomy incision was created on the Right breast. Underlying dermis and subcutaneous fat was divided using electrocautery. Using hooks to elevate the skin, thin mastectomy flaps were created cranially and caudally by sharply dividing the plane between the thin layer of subcutaneous fat and underlying breast parenchyma. Large subcutaneous veins were identified on the underside of the flap and preserved. Limits of dissection included the clavicle superiorly, sternum medially, down to and including the inframammary fold caudally, and to the anterior border of the latissimus dorsi muscle laterally. Thickness of the skin flaps was confirmed by palpation. There were no abnormalities within the low Right axilla and without adenopathy. The breast was then carefully dissected from the underlying pectoralis major muscle, taking care to include all of the underlying pectoralis fascia. The breast was oriented and submitted to pathology for routine histology.     A standard elliptical mastectomy incision was created on the Left breast. Underlying dermis and subcutaneous fat was divided using electrocautery. Using hooks to elevate the skin, thin mastectomy flaps were created cranially and caudally by sharply dividing the plane between the thin layer of subcutaneous fat and underlying breast parenchyma. Large subcutaneous veins were identified on the underside of the flap and preserved. Limits of dissection included the clavicle superiorly, sternum medially, down to and including the inframammary fold caudally, and to the anterior border of the latissimus dorsi muscle laterally. Thickness of the skin flaps was confirmed by palpation. There were no abnormalities within the low Left axilla and without adenopathy. The breast was then carefully dissected from the underlying  pectoralis major muscle, taking care to include all of the underlying pectoralis fascia. The breast was oriented and submitted to pathology for routine histology.     Next, dissection was carried out in the Left axilla.  A superficial venous branch was clipped and divided.   Survey of the axilla was done with the Neoprobe and visualization.  There were a total of 4 sentinel nodes identified.  Nodes 1,3 and 4 were identified as hot and blue-stained.  Node #2 was just hot.  All the nodes were excised using cautery clipping the hilum.  Palpation of the axilla revealed no palpable or suspicious adenopathy.  4 nodes were sent for frozen section.  All were negative for malignancy.  Palpation of the axilla revealed no suspicious adenopathy.  There were no further blue-stained nodes and no further hot nodes.      The mastectomy sites  were then thoroughly irrigated with normal saline. Hemostasis was assured using electrocautery. A #19Fr round drain were then placed under both the superior and inferior flap, respectively, and exited the lateral mastectomy lower skin flaps on each side. The skin was approximated and was taut, but not under undue tension. 3-0 Vicryl interrupted stitch was used to close the subcutaneous/dermal layers. A 4-0 monocryl suture was then used to close the subcuticular layer.    The patient tolerated this procedure without complications. Needle and sponge counts were reported to be correct x2 at the end of this portion of the case.     She was extubated in the operating room and transferred to the recovery room in stable condition.   3

## 2023-02-13 ENCOUNTER — APPOINTMENT (OUTPATIENT)
Dept: NEUROLOGY | Facility: CLINIC | Age: 53
End: 2023-02-13

## 2023-04-08 ENCOUNTER — OUTPATIENT (OUTPATIENT)
Dept: OUTPATIENT SERVICES | Facility: HOSPITAL | Age: 53
LOS: 1 days | End: 2023-04-08
Payer: COMMERCIAL

## 2023-04-08 DIAGNOSIS — Z90.49 ACQUIRED ABSENCE OF OTHER SPECIFIED PARTS OF DIGESTIVE TRACT: Chronic | ICD-10-CM

## 2023-04-08 DIAGNOSIS — Z98.890 OTHER SPECIFIED POSTPROCEDURAL STATES: Chronic | ICD-10-CM

## 2023-04-08 DIAGNOSIS — Z12.31 ENCOUNTER FOR SCREENING MAMMOGRAM FOR MALIGNANT NEOPLASM OF BREAST: ICD-10-CM

## 2023-04-08 PROCEDURE — 77063 BREAST TOMOSYNTHESIS BI: CPT

## 2023-04-08 PROCEDURE — 77067 SCR MAMMO BI INCL CAD: CPT

## 2023-04-08 PROCEDURE — 77063 BREAST TOMOSYNTHESIS BI: CPT | Mod: 26

## 2023-04-08 PROCEDURE — 77067 SCR MAMMO BI INCL CAD: CPT | Mod: 26

## 2023-04-09 DIAGNOSIS — Z12.31 ENCOUNTER FOR SCREENING MAMMOGRAM FOR MALIGNANT NEOPLASM OF BREAST: ICD-10-CM

## 2023-10-16 ENCOUNTER — NON-APPOINTMENT (OUTPATIENT)
Age: 53
End: 2023-10-16

## 2023-11-07 ENCOUNTER — NON-APPOINTMENT (OUTPATIENT)
Age: 53
End: 2023-11-07

## 2023-11-13 ENCOUNTER — OUTPATIENT (OUTPATIENT)
Dept: OUTPATIENT SERVICES | Facility: HOSPITAL | Age: 53
LOS: 1 days | End: 2023-11-13
Payer: COMMERCIAL

## 2023-11-13 ENCOUNTER — APPOINTMENT (OUTPATIENT)
Dept: CARDIOTHORACIC SURGERY | Facility: CLINIC | Age: 53
End: 2023-11-13
Payer: COMMERCIAL

## 2023-11-13 VITALS — HEIGHT: 65 IN | BODY MASS INDEX: 33.66 KG/M2 | WEIGHT: 202 LBS

## 2023-11-13 DIAGNOSIS — Z00.8 ENCOUNTER FOR OTHER GENERAL EXAMINATION: ICD-10-CM

## 2023-11-13 DIAGNOSIS — Z98.890 OTHER SPECIFIED POSTPROCEDURAL STATES: Chronic | ICD-10-CM

## 2023-11-13 DIAGNOSIS — Z90.49 ACQUIRED ABSENCE OF OTHER SPECIFIED PARTS OF DIGESTIVE TRACT: Chronic | ICD-10-CM

## 2023-11-13 DIAGNOSIS — E04.1 NONTOXIC SINGLE THYROID NODULE: ICD-10-CM

## 2023-11-13 DIAGNOSIS — Z87.891 PERSONAL HISTORY OF NICOTINE DEPENDENCE: ICD-10-CM

## 2023-11-13 PROCEDURE — 70260 X-RAY EXAM OF SKULL: CPT | Mod: 26

## 2023-11-13 PROCEDURE — 76536 US EXAM OF HEAD AND NECK: CPT

## 2023-11-13 PROCEDURE — 76536 US EXAM OF HEAD AND NECK: CPT | Mod: 26

## 2023-11-13 PROCEDURE — G0296 VISIT TO DETERM LDCT ELIG: CPT

## 2023-11-13 PROCEDURE — 70260 X-RAY EXAM OF SKULL: CPT

## 2023-11-14 DIAGNOSIS — E04.1 NONTOXIC SINGLE THYROID NODULE: ICD-10-CM

## 2023-12-14 ENCOUNTER — NON-APPOINTMENT (OUTPATIENT)
Age: 53
End: 2023-12-14

## 2023-12-27 ENCOUNTER — OUTPATIENT (OUTPATIENT)
Dept: OUTPATIENT SERVICES | Facility: HOSPITAL | Age: 53
LOS: 1 days | End: 2023-12-27
Payer: COMMERCIAL

## 2023-12-27 DIAGNOSIS — Z98.890 OTHER SPECIFIED POSTPROCEDURAL STATES: Chronic | ICD-10-CM

## 2023-12-27 DIAGNOSIS — Z90.49 ACQUIRED ABSENCE OF OTHER SPECIFIED PARTS OF DIGESTIVE TRACT: Chronic | ICD-10-CM

## 2023-12-27 DIAGNOSIS — R92.2 INCONCLUSIVE MAMMOGRAM: ICD-10-CM

## 2023-12-27 DIAGNOSIS — Z12.31 ENCOUNTER FOR SCREENING MAMMOGRAM FOR MALIGNANT NEOPLASM OF BREAST: ICD-10-CM

## 2023-12-27 DIAGNOSIS — R92.8 OTHER ABNORMAL AND INCONCLUSIVE FINDINGS ON DIAGNOSTIC IMAGING OF BREAST: ICD-10-CM

## 2023-12-27 PROCEDURE — G0279: CPT | Mod: 26

## 2023-12-27 PROCEDURE — 76642 ULTRASOUND BREAST LIMITED: CPT | Mod: 26,RT

## 2023-12-27 PROCEDURE — 77065 DX MAMMO INCL CAD UNI: CPT | Mod: 26,RT

## 2023-12-27 PROCEDURE — G0279: CPT

## 2023-12-27 PROCEDURE — 76642 ULTRASOUND BREAST LIMITED: CPT | Mod: RT

## 2023-12-27 PROCEDURE — 77065 DX MAMMO INCL CAD UNI: CPT | Mod: RT

## 2023-12-28 DIAGNOSIS — R92.8 OTHER ABNORMAL AND INCONCLUSIVE FINDINGS ON DIAGNOSTIC IMAGING OF BREAST: ICD-10-CM

## 2024-01-08 ENCOUNTER — OUTPATIENT (OUTPATIENT)
Dept: OUTPATIENT SERVICES | Facility: HOSPITAL | Age: 54
LOS: 1 days | End: 2024-01-08
Payer: COMMERCIAL

## 2024-01-08 DIAGNOSIS — Z90.49 ACQUIRED ABSENCE OF OTHER SPECIFIED PARTS OF DIGESTIVE TRACT: Chronic | ICD-10-CM

## 2024-01-08 DIAGNOSIS — Z00.8 ENCOUNTER FOR OTHER GENERAL EXAMINATION: ICD-10-CM

## 2024-01-08 DIAGNOSIS — Z98.890 OTHER SPECIFIED POSTPROCEDURAL STATES: Chronic | ICD-10-CM

## 2024-01-08 DIAGNOSIS — R91.1 SOLITARY PULMONARY NODULE: ICD-10-CM

## 2024-01-08 PROCEDURE — 71271 CT THORAX LUNG CANCER SCR C-: CPT

## 2024-01-08 PROCEDURE — 71271 CT THORAX LUNG CANCER SCR C-: CPT | Mod: 26

## 2024-01-09 DIAGNOSIS — R91.1 SOLITARY PULMONARY NODULE: ICD-10-CM

## 2024-02-22 ENCOUNTER — NON-APPOINTMENT (OUTPATIENT)
Age: 54
End: 2024-02-22

## 2024-04-09 ENCOUNTER — EMERGENCY (EMERGENCY)
Facility: HOSPITAL | Age: 54
LOS: 0 days | Discharge: HOME | End: 2024-04-10
Attending: EMERGENCY MEDICINE
Payer: COMMERCIAL

## 2024-04-09 VITALS
RESPIRATION RATE: 18 BRPM | DIASTOLIC BLOOD PRESSURE: 92 MMHG | SYSTOLIC BLOOD PRESSURE: 150 MMHG | WEIGHT: 201.06 LBS | TEMPERATURE: 98 F | OXYGEN SATURATION: 99 % | HEART RATE: 78 BPM | HEIGHT: 65 IN

## 2024-04-09 DIAGNOSIS — E78.00 PURE HYPERCHOLESTEROLEMIA, UNSPECIFIED: ICD-10-CM

## 2024-04-09 DIAGNOSIS — R61 GENERALIZED HYPERHIDROSIS: ICD-10-CM

## 2024-04-09 DIAGNOSIS — R20.2 PARESTHESIA OF SKIN: ICD-10-CM

## 2024-04-09 DIAGNOSIS — Z98.890 OTHER SPECIFIED POSTPROCEDURAL STATES: Chronic | ICD-10-CM

## 2024-04-09 DIAGNOSIS — I10 ESSENTIAL (PRIMARY) HYPERTENSION: ICD-10-CM

## 2024-04-09 DIAGNOSIS — Z90.49 ACQUIRED ABSENCE OF OTHER SPECIFIED PARTS OF DIGESTIVE TRACT: Chronic | ICD-10-CM

## 2024-04-09 DIAGNOSIS — Z87.891 PERSONAL HISTORY OF NICOTINE DEPENDENCE: ICD-10-CM

## 2024-04-09 DIAGNOSIS — R07.2 PRECORDIAL PAIN: ICD-10-CM

## 2024-04-09 LAB
ALBUMIN SERPL ELPH-MCNC: 5.1 G/DL — SIGNIFICANT CHANGE UP (ref 3.5–5.2)
ALP SERPL-CCNC: 100 U/L — SIGNIFICANT CHANGE UP (ref 30–115)
ALT FLD-CCNC: 36 U/L — SIGNIFICANT CHANGE UP (ref 0–41)
ANION GAP SERPL CALC-SCNC: 14 MMOL/L — SIGNIFICANT CHANGE UP (ref 7–14)
AST SERPL-CCNC: 27 U/L — SIGNIFICANT CHANGE UP (ref 0–41)
BASOPHILS # BLD AUTO: 0.05 K/UL — SIGNIFICANT CHANGE UP (ref 0–0.2)
BASOPHILS NFR BLD AUTO: 0.6 % — SIGNIFICANT CHANGE UP (ref 0–1)
BILIRUB SERPL-MCNC: 0.8 MG/DL — SIGNIFICANT CHANGE UP (ref 0.2–1.2)
BUN SERPL-MCNC: 21 MG/DL — HIGH (ref 10–20)
CALCIUM SERPL-MCNC: 11.1 MG/DL — HIGH (ref 8.4–10.5)
CHLORIDE SERPL-SCNC: 105 MMOL/L — SIGNIFICANT CHANGE UP (ref 98–110)
CO2 SERPL-SCNC: 25 MMOL/L — SIGNIFICANT CHANGE UP (ref 17–32)
CREAT SERPL-MCNC: 1.2 MG/DL — SIGNIFICANT CHANGE UP (ref 0.7–1.5)
EGFR: 53 ML/MIN/1.73M2 — LOW
EOSINOPHIL # BLD AUTO: 0.1 K/UL — SIGNIFICANT CHANGE UP (ref 0–0.7)
EOSINOPHIL NFR BLD AUTO: 1.2 % — SIGNIFICANT CHANGE UP (ref 0–8)
GLUCOSE SERPL-MCNC: 116 MG/DL — HIGH (ref 70–99)
HCT VFR BLD CALC: 45.3 % — SIGNIFICANT CHANGE UP (ref 37–47)
HGB BLD-MCNC: 15.4 G/DL — SIGNIFICANT CHANGE UP (ref 12–16)
IMM GRANULOCYTES NFR BLD AUTO: 0.4 % — HIGH (ref 0.1–0.3)
LYMPHOCYTES # BLD AUTO: 2.46 K/UL — SIGNIFICANT CHANGE UP (ref 1.2–3.4)
LYMPHOCYTES # BLD AUTO: 28.9 % — SIGNIFICANT CHANGE UP (ref 20.5–51.1)
MCHC RBC-ENTMCNC: 31.1 PG — HIGH (ref 27–31)
MCHC RBC-ENTMCNC: 34 G/DL — SIGNIFICANT CHANGE UP (ref 32–37)
MCV RBC AUTO: 91.5 FL — SIGNIFICANT CHANGE UP (ref 81–99)
MONOCYTES # BLD AUTO: 0.78 K/UL — HIGH (ref 0.1–0.6)
MONOCYTES NFR BLD AUTO: 9.2 % — SIGNIFICANT CHANGE UP (ref 1.7–9.3)
NEUTROPHILS # BLD AUTO: 5.1 K/UL — SIGNIFICANT CHANGE UP (ref 1.4–6.5)
NEUTROPHILS NFR BLD AUTO: 59.7 % — SIGNIFICANT CHANGE UP (ref 42.2–75.2)
NRBC # BLD: 0 /100 WBCS — SIGNIFICANT CHANGE UP (ref 0–0)
PLATELET # BLD AUTO: 248 K/UL — SIGNIFICANT CHANGE UP (ref 130–400)
PMV BLD: 9.4 FL — SIGNIFICANT CHANGE UP (ref 7.4–10.4)
POTASSIUM SERPL-MCNC: 4.9 MMOL/L — SIGNIFICANT CHANGE UP (ref 3.5–5)
POTASSIUM SERPL-SCNC: 4.9 MMOL/L — SIGNIFICANT CHANGE UP (ref 3.5–5)
PROT SERPL-MCNC: 7.8 G/DL — SIGNIFICANT CHANGE UP (ref 6–8)
RBC # BLD: 4.95 M/UL — SIGNIFICANT CHANGE UP (ref 4.2–5.4)
RBC # FLD: 12.8 % — SIGNIFICANT CHANGE UP (ref 11.5–14.5)
SODIUM SERPL-SCNC: 144 MMOL/L — SIGNIFICANT CHANGE UP (ref 135–146)
TROPONIN T, HIGH SENSITIVITY RESULT: <6 NG/L — SIGNIFICANT CHANGE UP (ref 6–13)
WBC # BLD: 8.52 K/UL — SIGNIFICANT CHANGE UP (ref 4.8–10.8)
WBC # FLD AUTO: 8.52 K/UL — SIGNIFICANT CHANGE UP (ref 4.8–10.8)

## 2024-04-09 PROCEDURE — 71046 X-RAY EXAM CHEST 2 VIEWS: CPT

## 2024-04-09 PROCEDURE — 93010 ELECTROCARDIOGRAM REPORT: CPT

## 2024-04-09 PROCEDURE — 93005 ELECTROCARDIOGRAM TRACING: CPT

## 2024-04-09 PROCEDURE — 99285 EMERGENCY DEPT VISIT HI MDM: CPT

## 2024-04-09 PROCEDURE — 75574 CT ANGIO HRT W/3D IMAGE: CPT | Mod: MC

## 2024-04-09 PROCEDURE — G0378: CPT

## 2024-04-09 PROCEDURE — 36415 COLL VENOUS BLD VENIPUNCTURE: CPT

## 2024-04-09 PROCEDURE — 85025 COMPLETE CBC W/AUTO DIFF WBC: CPT

## 2024-04-09 PROCEDURE — 99285 EMERGENCY DEPT VISIT HI MDM: CPT | Mod: 25

## 2024-04-09 PROCEDURE — 84484 ASSAY OF TROPONIN QUANT: CPT

## 2024-04-09 PROCEDURE — 71046 X-RAY EXAM CHEST 2 VIEWS: CPT | Mod: 26

## 2024-04-09 PROCEDURE — 80053 COMPREHEN METABOLIC PANEL: CPT

## 2024-04-09 RX ORDER — ASPIRIN/CALCIUM CARB/MAGNESIUM 324 MG
243 TABLET ORAL ONCE
Refills: 0 | Status: COMPLETED | OUTPATIENT
Start: 2024-04-09 | End: 2024-04-09

## 2024-04-09 RX ADMIN — Medication 243 MILLIGRAM(S): at 23:31

## 2024-04-09 NOTE — ED ADULT TRIAGE NOTE - CHIEF COMPLAINT QUOTE
BIBA from home with complaints of chest pain radiating to L shoulder. Patient with no history of cardiac issues.

## 2024-04-10 VITALS
SYSTOLIC BLOOD PRESSURE: 117 MMHG | RESPIRATION RATE: 18 BRPM | TEMPERATURE: 99 F | DIASTOLIC BLOOD PRESSURE: 56 MMHG | OXYGEN SATURATION: 97 % | HEART RATE: 51 BPM

## 2024-04-10 LAB — TROPONIN T, HIGH SENSITIVITY RESULT: <6 NG/L — SIGNIFICANT CHANGE UP (ref 6–13)

## 2024-04-10 PROCEDURE — 93010 ELECTROCARDIOGRAM REPORT: CPT

## 2024-04-10 PROCEDURE — 75574 CT ANGIO HRT W/3D IMAGE: CPT | Mod: 26,MC

## 2024-04-10 PROCEDURE — 99236 HOSP IP/OBS SAME DATE HI 85: CPT

## 2024-04-10 RX ORDER — METOPROLOL TARTRATE 50 MG
50 TABLET ORAL ONCE
Refills: 0 | Status: COMPLETED | OUTPATIENT
Start: 2024-04-10 | End: 2024-04-10

## 2024-04-10 RX ORDER — SODIUM CHLORIDE 9 MG/ML
1000 INJECTION, SOLUTION INTRAVENOUS
Refills: 0 | Status: DISCONTINUED | OUTPATIENT
Start: 2024-04-10 | End: 2024-04-10

## 2024-04-10 RX ADMIN — Medication 50 MILLIGRAM(S): at 05:24

## 2024-04-10 RX ADMIN — SODIUM CHLORIDE 100 MILLILITER(S): 9 INJECTION, SOLUTION INTRAVENOUS at 01:22

## 2024-04-10 NOTE — ED CDU PROVIDER DISPOSITION NOTE - PATIENT PORTAL LINK FT
You can access the FollowMyHealth Patient Portal offered by [facility] by registering at the following website: [email or web address]. By joining [employer]’s FollowMyHealth portal, you will also be able to view your health information using other applications (apps) compatible with our system.
[place]

## 2024-04-10 NOTE — ED PROVIDER NOTE - PHYSICAL EXAMINATION
CONSTITUTIONAL: Well-appearing; in no apparent distress.   EYES: PERRL; EOM intact.   CARDIOVASCULAR: Normal S1, S2; no murmurs, rubs, or gallops.   RESPIRATORY: Normal chest excursion with respiration; breath sounds clear and equal bilaterally; no wheezes, rhonchi, or rales.  GI/: Normal bowel sounds; non-distended; non-tender; no palpable organomegaly.   MS: No calf swelling and tenderness.  SKIN: Normal for age and race; warm; dry; good turgor; no apparent lesions or exudate.   NEURO/PSYCH: A & O x 4; grossly unremarkable.

## 2024-04-10 NOTE — ED CDU PROVIDER INITIAL DAY NOTE - ATTENDING APP SHARED VISIT CONTRIBUTION OF CARE
53-year-old female non-smoker PMH HL presenting with chest pain x 1 day.  Reports midsternal chest pain earlier today while at rest, radiating to left chest and arm, accompanied by diaphoresis & tingling sensation to left lower lip.  No dizziness, SOB, nausea vomiting, abdominal pain, edema.  No recent travel, surgery, immobilization, hormone use.  Unclear family history of heart disease (parents  in 40s and 50s).  Patient followed by cardiologist at [facility], reports having a negative stress test approximately 2 years prior.    PE:  nad  skin warm, dry  ncat  neck supple  rrr nl s1s2 no mrg  ctab no wrr  abd soft ntnd no palpable masses no rgr  back non-tender no cvat  ext no cce dpi  neuro aaox3 grossly nf exam

## 2024-04-10 NOTE — ED CDU PROVIDER INITIAL DAY NOTE - OBJECTIVE STATEMENT
53 years old female history of hypertension, high cholesterol, present complaints of left-sided chest pain/pressure with tingling sensation down her left arm, back of her neck and left-sided lower lip started 1 hour prior to ED arrival.  Reports she witnessed her daughter was going to pass down so she sat down on the floor with her daughter.  Started having above symptoms so she comes to ED for evaluation.  Also report an episode of diaphoresis associated chest pain.  Denies shortness of breath.  Denies exogenous hormone use/recent hospitalization/hx of DVT. denies recent illness/fever/chill/HA/dizziness/sob/abd pain/n/v/d/urinary sxs.    Reports she had a nuclear stress test about 2 years ago with her cardiologist at [facility] and it was normal.

## 2024-04-10 NOTE — ED PROVIDER NOTE - ATTENDING APP SHARED VISIT CONTRIBUTION OF CARE
53-year-old female non-smoker PMH HL presenting with chest pain x 1 day.  Reports midsternal chest pain earlier today while at rest, radiating to left chest and arm, accompanied by diaphoresis & tingling sensation to left lower lip.  No dizziness, SOB, nausea vomiting, abdominal pain, edema.  No recent travel, surgery, immobilization, hormone use.  Unclear family history of heart disease (parents  in 40s and 50s).  Patient followed by cardiologist at [facility], reports having a negative stress test approximately 2 years prior.    PE:  nad  skin warm, dry  ncat  neck supple  rrr nl s1s2 no mrg  ctab no wrr  abd soft ntnd no palpable masses no rgr  back non-tender no cvat  ext no cce dpi  neuro aaox3 grossly nf exam

## 2024-04-10 NOTE — ED PROVIDER NOTE - CLINICAL SUMMARY MEDICAL DECISION MAKING FREE TEXT BOX
Labs, EKG and imaging were ordered, where indicated.  Independent interpretation of any labs, EKG & imaging that was ordered was performed by me, Dr. Vang. Appropriate medications for patient's presenting complaints were ordered and effects were reassessed, where indicated.  Patient's records (prior hospital, ED visit, and/or nursing home note) were reviewed, if available.  Additional history was obtained from EMS, family, and/or PCP (where available).  Escalation to admission/observation was considered.  Patient requires inpatient hospitalization - monitored setting.     cp, pmh hl, poss FHx HD - ekg nsr, cxr clear, labs/tn nml - pt rpts that she is supposed to see her Cardiologist today, offered to discharge and let her f/u however pt feels more comfortable to complete definitive testing in hospital - EDOU for ACS work-up

## 2024-04-10 NOTE — ED PROVIDER NOTE - OBJECTIVE STATEMENT
53 years old female history of hypertension, high cholesterol, present complaints of left-sided chest pain/pressure with tingling sensation down her left arm, back of her neck and left-sided lower lip started 1 hour prior to ED arrival.  Reports she witnessed her daughter was going to pass down so she sat down on the floor with her daughter.  Started having above symptoms so she comes to ED for evaluation.  Also report an episode of diaphoresis associated chest pain.  Denies shortness of breath.  Denies exogenous hormone use/recent hospitalization/hx of DVT. denies recent illness/fever/chill/HA/dizziness/sob/abd pain/n/v/d/urinary sxs.    Reports she had a nuclear stress test about 2 years ago with her cardiologist at [facility] and it was normal.

## 2024-04-10 NOTE — ED CDU PROVIDER DISPOSITION NOTE - ATTENDING CONTRIBUTION TO CARE
I personally evaluated the patient. I reviewed the Resident’s or Physician Assistant’s note (as assigned above), and agree with the findings and plan except as documented in my note., ,

## 2024-04-10 NOTE — ED CDU PROVIDER DISPOSITION NOTE - CLINICAL COURSE
53-year-old female past medical history hyperlipidemia ex-smoker, presents with 2 days of chest pain patient describes as weird burning pressure substernal felt sweaty associated with left arm sensation and lip tingling.  Patient seen in ED had EKG troponin which were negative placed in observation unit for CCTA.  Now chest pain-free feels better.  States had a nuclear stress test 2 years ago at [facility].  Ex-smoker.  Positive family history.    On exam, AFVSS, Well appearing, No acute distress, NCAT, EOMI, PERRLA, MMM, Neck supple, LCTAB, RRR nl s1s2 No mrg, Abdomen Soft NTND, AAOx3, No Focal Deficits, No LE edema or calf TTP,    A/P; chest pain EKG normal sinus, troponin negative x 2, chest x-ray clear, CCTA normal coronaries CAD RADS 0, patient asymptomatic at this time DC home follow-up with cardiology as outpatient 1 to 2 weeks, strict return precautions

## 2024-05-31 NOTE — ED PROVIDER NOTE - ADDITIONAL EKG
ASSESSMENT AND PLAN   IMPRESSION  ED Diagnosis   1. MCI (mild cognitive impairment)        2. Numbness in feet  Protein Electrophoresis      3. Balance problem  SERVICE TO PHYSICAL THERAPY        Erika De Santiago is a 79 year old left handed female with a past medical history of HTN, HLD, Hashimoto's thyroiditis s/p thyroidectomy, ulcers s/p partial gastrectomy and vagotomy, INOCENCIA on CPAP, seen for follow up of cognitive issues and falls.     On examination, patient is appropriate conversationally. No rest tremor or rigidity. Mild weakness R DF. +Mirror movements. Prior exam notable for hyporeflexia BLE, dec sensation PP and vibration RLE, +Romberg.     Patient presented with mild cognitive issues such as word finding difficulty, slowness in mental activities, and trouble with simple math while playing games since 2023. She had previously had an MRI Brain 7/2023 performed to rule out stroke after she presented with dizziness to the ER - demonstrated moderate white matter disease, mild atrophy. Neuropsychological testing 12/2023 was consistent with mild neurocognitive disorder, likely due to vascular risk factors. Reviewed recommendations to manage vascular risk factors to prevent worsening going forward. Reviewed the importance of good sleep and alternatives to zolpidem and diphenhydramine, such as CBTI-I.     She also notes a few falls over the past few years. Most of her falls seem to have been mechanical in the setting of tripping. She does appear to have some weakness R DF affecting gait and some sensory deficits of the feet. She previously has been diagnosed with peripheral neuropathy and had an EMG many years ago, but I do not have the report. No history of diabetes. Differential includes L5 radiculopathy vs neuropathy. MRI L spine 6/2023 comments on R>L disc-osteophyte complex abutting exiting L5 nerve root. Discussed that next steps would be to repeat EMG; however she prefers to hold off at this time, as she  reports symptoms are likely chronic. She was interested in PT.     PLAN  - Planning to go to speech therapy for cognitive exercises   - Discussed reducing benadryl and Ambien if possible as may impact cognition and balance. Consider CBT-I if needed for ongoing insomnia  - Discussed brain healthy lifestyle  - Referral to PT for balance   - Check SPEP for neuropathy  - Offered EMG; patient prefers to hold off for now. If worsening weakness/numbness, would proceed with testing    We will plan to have Ms. De Santiago return to clinic in 1 year    SUBJECTIVE     INTERVAL HISTORY  Patient last seen 10/5/23. Present with  Dm.     Neuropsych testing 12/2023 - MCI. Possibly due to vascular risk factors.       Dm says she had a rough winter and spring. Had a kidney stone, had to wait to take it out due to infection. Went to a [employer] camp and got the flu and pneumonia back in February, needed to be admitted. Had to go to the ER twice when she was sick with the flu as she wasn't feeling well.     Feeling like herself now. Doesn't have any specific questions.     Cognition feels about the same since I last her. Dm does still notice she is hesitant or slower with playing Cribbage. Still functioning quite well, driving and doing everything    Dm is concerned about her balance sometimes. Hard to get out of car. She is cautious. She doesn't think her balance is getting worse. Had 1 fall out of bed in the setting of flu in Feb; no falls while walking    Taking benadryl and ambien. Wonders if she needs it    Exercises on a bike, does weight training    INITIAL HISTORY  The patient is accompanied by their  Dm who helps to provide the history. Patient referred regarding cognitive and balance issues. Her  Dm is a patient of mine.     Cognition: Erika notes she finds it harder to think of a word, for a few years, not getting worse. Dm notes some small changes for the last 1 year - when  they do crossword puzzles, Dm often finished before her - she used to always finish before him (but last night she finished way before him). Dm has also noticed when playing cribbage, it may take her longer to play a card than it used to; hard to do math. Erika is still independent with all tasks including driving and managing medications etc.     Balance: Has had a few falls. First fall she had was around 2015 - tripped on an uneven floor at her daughter's wedding. Had a fall in [place] in 2022 - got up and tripped on her shoes. May 2023 - had a fall in the sunroom while cleaning, foot ran into something. July 2023 - had a fall at the bottom of the stairs, going upwards, leaned forwards to grab something on the stairs, then lost her balance. In general, feels walking is fine. Takes a second when she gets up from a chair or gets out of the car. Walks independently. She is working on this at the gym, balance exercises at home, did PT this summer.     Had previously followed with Dr. Harris in neurology regarding peripheral neuropathy. Thinks she was dx in 2011. Bottoms of her feet are numb, worse in the morning. Has progressed a little over time, but always just the bottoms of the feet. Denies burining pain. Taking B12 for this. Gets cramps in her legs at night - taking gabapentin 300mg nightly for this. Had reduced to 100mg nightly over the summer and felt cramps were worse so went back to 300mg.     A couple weeks after GIB, had an episode of nausea, vomiting, dizziness; was evaluated for stroke with MRI brain. No stroke found. Did note some atrophy. Dizziness resolved inpatient - had high blood pressure on admssion    Has some back issues - 2 compression fractures. Not rec to get surgery, they are \"regenerating\" themselves    PAST MEDICAL HISTORY  Past Medical History:   Diagnosis Date    Anemia     Bilateral lower extremity edema 05/31/2016    Blood clot associated with vein wall inflammation     Bronchial  spasms     treated with albuterol inhaler, happens 1-2x/year    Bronchiectasis without complication  (CMD) 12/06/2016    Compression fracture 02/2016    L1    Diverticulosis     Fibroid     Gastric ulcer 09/27/2008    GERD (gastroesophageal reflux disease)     vs esophageal spasm    GIB (gastrointestinal bleeding) 05/2023    History of tonsillectomy 1948    Info provided by patient    HLD (hyperlipidemia)     Hypertension     Hypothyroidism 1965    hashimoto's, s/p thyroidectomy    Insomnia     Internal hemorrhoids 11/01/2005    Kidney stones 2003    Peripheral neuropathy     on gabapentin    Pulmonary embolism  (CMD) 06/2013    provoked. s/p 6 mos warfarin    Sleep apnea        MEDICATIONS  Current Outpatient Medications   Medication Instructions    albuterol (Ventolin HFA) 108 (90 Base) MCG/ACT inhaler 2 puffs, Inhalation, 4 TIMES DAILY PRN    alendronate (FOSAMAX) 70 mg, Oral, EVERY 7 DAYS    Ascorbic Acid (VITAMIN C PO) 1 tablet, Oral, EVERY MORNING    aspirin 81 mg, Oral, DAILY    atorvastatin (LIPITOR) 20 mg, Oral, DAILY    Calcium Ascorbate 500 mg, Oral, DAILY    diphenhydrAMINE (BENADRYL) 25 mg, Oral, NIGHTLY PRN    ezetimibe (ZETIA) 10 mg, Oral, DAILY    ferrous sulfate 325 mg, Oral, DAILY WITH BREAKFAST    fluticasone (Flonase) 50 MCG/ACT nasal spray 2 sprays each nostril daily    furosemide (LASIX) 40 mg, Oral, DAILY PRN    gabapentin (NEURONTIN) 300 mg, Oral, 2 TIMES DAILY    gabapentin (NEURONTIN) 100 mg, Oral, 2 times daily    irbesartan (AVAPRO) 150 mg, Oral, 2 TIMES DAILY    levothyroxine 125 mcg, Oral, DAILY    meclizine (ANTIVERT) 25 mg, Oral, 3 TIMES DAILY PRN    ondansetron (ZOFRAN) 8 mg, Oral, EVERY 8 HOURS PRN    Polyethyl Glycol-Propyl Glycol (SYSTANE OP) 1 drop, Ophthalmic, PRN    potassium chloride (KLOR-CON SPRINKLE) 10 MEQ ER capsule 10 mEq, Oral, DAILY PRN    vitamin B-12 (CYANOCOBALAMIN) 1,000 mcg, Oral, DAILY    Vitamin D 5,000 Units, Oral, DAILY    zolpidem (AMBIEN) 2.5 mg, Oral,  NIGHTLY PRN, May take additional 1/2 tablet as needed.        ALLERGIES  ALLERGIES:  Lisinopril and Erythromycin    SOCIAL HISTORY  She  reports that she has never smoked. She has never used smokeless tobacco. She reports that she does not drink alcohol and does not use drugs.     Retired in 1998,   Enjoys cross stitching, [employer], reading, golf     FAMILY MEDICAL HISTORY  Her family history includes Blood Disorder in her father; Cancer, Breast (age of onset: 63) in her maternal aunt; Cancer, Ovarian in her maternal cousin; Celiac Disease in her sister; Diabetes in her mother; Heart disease in her mother; Obesity in her son; Patient is unaware of any medical problems in her daughter, maternal grandfather, maternal grandmother, paternal grandfather, and paternal grandmother; Stroke in her mother.     Mom - stroke  Father - \"blood disease\"    OBJECTIVE   PHYSICAL EXAMINATION  Vital Signs  Visit Vitals  BP (!) 147/73   Pulse 66   Ht 5' 4\" (1.626 m)   Wt 78 kg (172 lb)   SpO2 99%   BMI 29.52 kg/m²     Physical Examination  Well developed, well nourished, in no acute distress.     Neurological Examination  Mental status: Awake, alert. Can say MOYB.     Speech: mild lisp (baseline), no hypophonia    Cranial nerves: II through XII are as follows: pupils equal and reactive to light; extraocular movements intact; face appears symmetric; hearing intact to voice; palate and tongue are midline; and shoulder shrug is symmetric    Motor: Muscle bulk is normal in all extremities. Strength is 5/5 throughout the arms and legs with the exception of 4+ R shoulder (pain limited) and R DF 4+    Movement exam:   +Mirror movements of mouth  No rest tremor. No action tremor  Muscle tone is normal in the arms and legs  Some dysfluency without decrement, throughout; slow toe taps on R possibly related to weakness     Sensory: +Romberg. Prior - Dec vibration RLE up to ankle, LLE at toes. Dec PP R plantar side foot.      Coordination: Finger to nose testing is accurate without dysmetria    Reflexes: 1+ BUE, absent BLE. Toes mute    Gait: Able to rise independently from chair with arms crossed with 2 attempts. Casual gait observed in hallway - flat foot strike R>L, good stride length, slightly wide base.       LABS and STUDIES  TSH (mcUnits/mL)   Date Value   07/12/2023 0.663     Hemoglobin A1C (%)   Date Value   05/29/2023 5.4     B12 (pg/mL)   Date Value   09/06/2016 1,565 (H)       TIME/COMMUNICATION  I spent a total of 50 minutes today in chart review, counseling, discussion of treatment options, and coordination of care. Details outlined in Assessment/Plan.    Mesha Garcia MD, MPH  Movement Disorders Neurologist, Department of Neuroscience  [address], [address]  [place] [address]  O: [phone]           Additional EKG Note...

## 2024-07-30 ENCOUNTER — NON-APPOINTMENT (OUTPATIENT)
Age: 54
End: 2024-07-30

## 2024-11-01 ENCOUNTER — OUTPATIENT (OUTPATIENT)
Dept: OUTPATIENT SERVICES | Facility: HOSPITAL | Age: 54
LOS: 1 days | End: 2024-11-01
Payer: COMMERCIAL

## 2024-11-01 DIAGNOSIS — N39.0 URINARY TRACT INFECTION, SITE NOT SPECIFIED: ICD-10-CM

## 2024-11-01 DIAGNOSIS — Z90.49 ACQUIRED ABSENCE OF OTHER SPECIFIED PARTS OF DIGESTIVE TRACT: Chronic | ICD-10-CM

## 2024-11-01 DIAGNOSIS — Z00.8 ENCOUNTER FOR OTHER GENERAL EXAMINATION: ICD-10-CM

## 2024-11-01 DIAGNOSIS — Z98.890 OTHER SPECIFIED POSTPROCEDURAL STATES: Chronic | ICD-10-CM

## 2024-11-01 PROCEDURE — 76770 US EXAM ABDO BACK WALL COMP: CPT

## 2024-11-01 PROCEDURE — 76770 US EXAM ABDO BACK WALL COMP: CPT | Mod: 26

## 2024-11-02 DIAGNOSIS — N39.0 URINARY TRACT INFECTION, SITE NOT SPECIFIED: ICD-10-CM

## 2025-02-05 ENCOUNTER — NON-APPOINTMENT (OUTPATIENT)
Age: 55
End: 2025-02-05

## 2025-02-10 ENCOUNTER — APPOINTMENT (OUTPATIENT)
Dept: OBGYN | Facility: CLINIC | Age: 55
End: 2025-02-10

## 2025-02-10 ENCOUNTER — NON-APPOINTMENT (OUTPATIENT)
Age: 55
End: 2025-02-10

## 2025-02-10 VITALS
WEIGHT: 208 LBS | DIASTOLIC BLOOD PRESSURE: 88 MMHG | SYSTOLIC BLOOD PRESSURE: 123 MMHG | BODY MASS INDEX: 34.66 KG/M2 | HEIGHT: 65 IN

## 2025-02-10 DIAGNOSIS — Z01.411 ENCOUNTER FOR GYNECOLOGICAL EXAMINATION (GENERAL) (ROUTINE) WITH ABNORMAL FINDINGS: ICD-10-CM

## 2025-02-10 PROCEDURE — 99386 PREV VISIT NEW AGE 40-64: CPT

## 2025-02-10 PROCEDURE — 76830 TRANSVAGINAL US NON-OB: CPT

## 2025-02-10 PROCEDURE — 99213 OFFICE O/P EST LOW 20 MIN: CPT | Mod: 25

## 2025-02-10 PROCEDURE — 99459 PELVIC EXAMINATION: CPT

## 2025-02-11 ENCOUNTER — TRANSCRIPTION ENCOUNTER (OUTPATIENT)
Age: 55
End: 2025-02-11

## 2025-02-12 LAB — HPV HIGH+LOW RISK DNA PNL CVX: NOT DETECTED

## 2025-02-14 LAB — CYTOLOGY CVX/VAG DOC THIN PREP: ABNORMAL

## 2025-02-18 ENCOUNTER — NON-APPOINTMENT (OUTPATIENT)
Age: 55
End: 2025-02-18

## 2025-02-26 PROBLEM — Z85.41 HISTORY OF CERVICAL CANCER: Status: ACTIVE | Noted: 2025-02-26

## 2025-03-05 ENCOUNTER — APPOINTMENT (OUTPATIENT)
Dept: CARDIOLOGY | Facility: CLINIC | Age: 55
End: 2025-03-05
Payer: COMMERCIAL

## 2025-03-05 VITALS
BODY MASS INDEX: 32.65 KG/M2 | DIASTOLIC BLOOD PRESSURE: 84 MMHG | WEIGHT: 196 LBS | HEART RATE: 78 BPM | HEIGHT: 65 IN | SYSTOLIC BLOOD PRESSURE: 124 MMHG

## 2025-03-05 DIAGNOSIS — I65.23 OCCLUSION AND STENOSIS OF BILATERAL CAROTID ARTERIES: ICD-10-CM

## 2025-03-05 DIAGNOSIS — R55 SYNCOPE AND COLLAPSE: ICD-10-CM

## 2025-03-05 DIAGNOSIS — E78.00 PURE HYPERCHOLESTEROLEMIA, UNSPECIFIED: ICD-10-CM

## 2025-03-05 PROCEDURE — 99204 OFFICE O/P NEW MOD 45 MIN: CPT

## 2025-03-05 PROCEDURE — G2211 COMPLEX E/M VISIT ADD ON: CPT | Mod: NC

## 2025-03-05 PROCEDURE — 93000 ELECTROCARDIOGRAM COMPLETE: CPT

## 2025-03-10 ENCOUNTER — APPOINTMENT (OUTPATIENT)
Dept: CARDIOTHORACIC SURGERY | Facility: CLINIC | Age: 55
End: 2025-03-10
Payer: COMMERCIAL

## 2025-03-10 VITALS — BODY MASS INDEX: 32.65 KG/M2 | WEIGHT: 196 LBS | HEIGHT: 65 IN

## 2025-03-10 DIAGNOSIS — Z87.891 PERSONAL HISTORY OF NICOTINE DEPENDENCE: ICD-10-CM

## 2025-03-10 PROCEDURE — ACP01: CPT | Mod: NC

## 2025-03-26 ENCOUNTER — NON-APPOINTMENT (OUTPATIENT)
Age: 55
End: 2025-03-26

## 2025-03-28 ENCOUNTER — APPOINTMENT (OUTPATIENT)
Dept: NEUROLOGY | Facility: CLINIC | Age: 55
End: 2025-03-28
Payer: COMMERCIAL

## 2025-03-28 ENCOUNTER — NON-APPOINTMENT (OUTPATIENT)
Age: 55
End: 2025-03-28

## 2025-03-28 VITALS
HEART RATE: 78 BPM | HEIGHT: 65 IN | DIASTOLIC BLOOD PRESSURE: 81 MMHG | SYSTOLIC BLOOD PRESSURE: 116 MMHG | WEIGHT: 189 LBS | OXYGEN SATURATION: 99 % | BODY MASS INDEX: 31.49 KG/M2 | RESPIRATION RATE: 20 BRPM

## 2025-03-28 DIAGNOSIS — M47.12 OTHER SPONDYLOSIS WITH MYELOPATHY, CERVICAL REGION: ICD-10-CM

## 2025-03-28 DIAGNOSIS — M47.22 OTHER SPONDYLOSIS WITH MYELOPATHY, CERVICAL REGION: ICD-10-CM

## 2025-03-28 DIAGNOSIS — G45.8 OTHER TRANSIENT CEREBRAL ISCHEMIC ATTACKS AND RELATED SYNDROMES: ICD-10-CM

## 2025-03-28 PROCEDURE — 99204 OFFICE O/P NEW MOD 45 MIN: CPT

## 2025-04-04 ENCOUNTER — RESULT REVIEW (OUTPATIENT)
Age: 55
End: 2025-04-04

## 2025-04-04 ENCOUNTER — OUTPATIENT (OUTPATIENT)
Dept: OUTPATIENT SERVICES | Facility: HOSPITAL | Age: 55
LOS: 1 days | End: 2025-04-04
Payer: COMMERCIAL

## 2025-04-04 DIAGNOSIS — Z98.890 OTHER SPECIFIED POSTPROCEDURAL STATES: Chronic | ICD-10-CM

## 2025-04-04 DIAGNOSIS — Z90.49 ACQUIRED ABSENCE OF OTHER SPECIFIED PARTS OF DIGESTIVE TRACT: Chronic | ICD-10-CM

## 2025-04-04 DIAGNOSIS — Z12.31 ENCOUNTER FOR SCREENING MAMMOGRAM FOR MALIGNANT NEOPLASM OF BREAST: ICD-10-CM

## 2025-04-04 PROCEDURE — 77063 BREAST TOMOSYNTHESIS BI: CPT | Mod: 26

## 2025-04-04 PROCEDURE — 77063 BREAST TOMOSYNTHESIS BI: CPT

## 2025-04-04 PROCEDURE — 77067 SCR MAMMO BI INCL CAD: CPT | Mod: 26

## 2025-04-04 PROCEDURE — 77067 SCR MAMMO BI INCL CAD: CPT

## 2025-04-05 DIAGNOSIS — Z12.31 ENCOUNTER FOR SCREENING MAMMOGRAM FOR MALIGNANT NEOPLASM OF BREAST: ICD-10-CM

## 2025-04-07 DIAGNOSIS — N64.89 OTHER SPECIFIED DISORDERS OF BREAST: ICD-10-CM

## 2025-04-08 ENCOUNTER — RESULT REVIEW (OUTPATIENT)
Age: 55
End: 2025-04-08

## 2025-04-08 ENCOUNTER — OUTPATIENT (OUTPATIENT)
Dept: OUTPATIENT SERVICES | Facility: HOSPITAL | Age: 55
LOS: 1 days | End: 2025-04-08
Payer: COMMERCIAL

## 2025-04-08 DIAGNOSIS — R92.8 OTHER ABNORMAL AND INCONCLUSIVE FINDINGS ON DIAGNOSTIC IMAGING OF BREAST: ICD-10-CM

## 2025-04-08 DIAGNOSIS — Z98.890 OTHER SPECIFIED POSTPROCEDURAL STATES: Chronic | ICD-10-CM

## 2025-04-08 PROCEDURE — 76642 ULTRASOUND BREAST LIMITED: CPT | Mod: 26,LT

## 2025-04-08 PROCEDURE — 77065 DX MAMMO INCL CAD UNI: CPT | Mod: LT

## 2025-04-08 PROCEDURE — 76642 ULTRASOUND BREAST LIMITED: CPT | Mod: LT

## 2025-04-08 PROCEDURE — G0279: CPT | Mod: 26

## 2025-04-08 PROCEDURE — G0279: CPT

## 2025-04-08 PROCEDURE — 77065 DX MAMMO INCL CAD UNI: CPT | Mod: 26,LT

## 2025-04-09 DIAGNOSIS — R92.8 OTHER ABNORMAL AND INCONCLUSIVE FINDINGS ON DIAGNOSTIC IMAGING OF BREAST: ICD-10-CM

## 2025-04-15 ENCOUNTER — RESULT REVIEW (OUTPATIENT)
Age: 55
End: 2025-04-15

## 2025-04-15 ENCOUNTER — OUTPATIENT (OUTPATIENT)
Dept: OUTPATIENT SERVICES | Facility: HOSPITAL | Age: 55
LOS: 1 days | End: 2025-04-15
Payer: COMMERCIAL

## 2025-04-15 DIAGNOSIS — G45.8 OTHER TRANSIENT CEREBRAL ISCHEMIC ATTACKS AND RELATED SYNDROMES: ICD-10-CM

## 2025-04-15 DIAGNOSIS — Z00.8 ENCOUNTER FOR OTHER GENERAL EXAMINATION: ICD-10-CM

## 2025-04-15 DIAGNOSIS — Z98.890 OTHER SPECIFIED POSTPROCEDURAL STATES: Chronic | ICD-10-CM

## 2025-04-15 DIAGNOSIS — Z90.49 ACQUIRED ABSENCE OF OTHER SPECIFIED PARTS OF DIGESTIVE TRACT: Chronic | ICD-10-CM

## 2025-04-15 DIAGNOSIS — M47.12 OTHER SPONDYLOSIS WITH MYELOPATHY, CERVICAL REGION: ICD-10-CM

## 2025-04-15 PROCEDURE — 72141 MRI NECK SPINE W/O DYE: CPT | Mod: 26

## 2025-04-15 PROCEDURE — 70551 MRI BRAIN STEM W/O DYE: CPT | Mod: 26

## 2025-04-15 PROCEDURE — 72141 MRI NECK SPINE W/O DYE: CPT

## 2025-04-15 PROCEDURE — 93880 EXTRACRANIAL BILAT STUDY: CPT

## 2025-04-15 PROCEDURE — 93880 EXTRACRANIAL BILAT STUDY: CPT | Mod: 26

## 2025-04-15 PROCEDURE — 70551 MRI BRAIN STEM W/O DYE: CPT

## 2025-04-16 DIAGNOSIS — M47.12 OTHER SPONDYLOSIS WITH MYELOPATHY, CERVICAL REGION: ICD-10-CM

## 2025-04-16 DIAGNOSIS — G45.8 OTHER TRANSIENT CEREBRAL ISCHEMIC ATTACKS AND RELATED SYNDROMES: ICD-10-CM

## 2025-07-24 ENCOUNTER — EMERGENCY (EMERGENCY)
Facility: HOSPITAL | Age: 55
LOS: 0 days | Discharge: HOME | End: 2025-07-24
Attending: EMERGENCY MEDICINE
Payer: COMMERCIAL

## 2025-07-24 VITALS
SYSTOLIC BLOOD PRESSURE: 118 MMHG | WEIGHT: 188.05 LBS | TEMPERATURE: 100 F | DIASTOLIC BLOOD PRESSURE: 77 MMHG | HEIGHT: 65 IN | HEART RATE: 91 BPM | RESPIRATION RATE: 18 BRPM | OXYGEN SATURATION: 99 %

## 2025-07-24 DIAGNOSIS — M54.50 LOW BACK PAIN, UNSPECIFIED: ICD-10-CM

## 2025-07-24 DIAGNOSIS — Z90.49 ACQUIRED ABSENCE OF OTHER SPECIFIED PARTS OF DIGESTIVE TRACT: Chronic | ICD-10-CM

## 2025-07-24 DIAGNOSIS — M79.651 PAIN IN RIGHT THIGH: ICD-10-CM

## 2025-07-24 DIAGNOSIS — E78.5 HYPERLIPIDEMIA, UNSPECIFIED: ICD-10-CM

## 2025-07-24 DIAGNOSIS — Z98.890 OTHER SPECIFIED POSTPROCEDURAL STATES: Chronic | ICD-10-CM

## 2025-07-24 DIAGNOSIS — I10 ESSENTIAL (PRIMARY) HYPERTENSION: ICD-10-CM

## 2025-07-24 DIAGNOSIS — R10.31 RIGHT LOWER QUADRANT PAIN: ICD-10-CM

## 2025-07-24 DIAGNOSIS — Z87.891 PERSONAL HISTORY OF NICOTINE DEPENDENCE: ICD-10-CM

## 2025-07-24 LAB
ALBUMIN SERPL ELPH-MCNC: 5.1 G/DL — SIGNIFICANT CHANGE UP (ref 3.5–5.2)
ALP SERPL-CCNC: 95 U/L — SIGNIFICANT CHANGE UP (ref 30–115)
ALT FLD-CCNC: 32 U/L — SIGNIFICANT CHANGE UP (ref 0–41)
ANION GAP SERPL CALC-SCNC: 14 MMOL/L — SIGNIFICANT CHANGE UP (ref 7–14)
APPEARANCE UR: CLEAR — SIGNIFICANT CHANGE UP
AST SERPL-CCNC: 26 U/L — SIGNIFICANT CHANGE UP (ref 0–41)
BASOPHILS # BLD AUTO: 0.04 K/UL — SIGNIFICANT CHANGE UP (ref 0–0.2)
BASOPHILS NFR BLD AUTO: 0.5 % — SIGNIFICANT CHANGE UP (ref 0–1)
BILIRUB SERPL-MCNC: 0.6 MG/DL — SIGNIFICANT CHANGE UP (ref 0.2–1.2)
BILIRUB UR-MCNC: NEGATIVE — SIGNIFICANT CHANGE UP
BUN SERPL-MCNC: 12 MG/DL — SIGNIFICANT CHANGE UP (ref 10–20)
CALCIUM SERPL-MCNC: 10.4 MG/DL — SIGNIFICANT CHANGE UP (ref 8.4–10.5)
CHLORIDE SERPL-SCNC: 103 MMOL/L — SIGNIFICANT CHANGE UP (ref 98–110)
CO2 SERPL-SCNC: 23 MMOL/L — SIGNIFICANT CHANGE UP (ref 17–32)
COLOR SPEC: YELLOW — SIGNIFICANT CHANGE UP
CREAT SERPL-MCNC: 0.9 MG/DL — SIGNIFICANT CHANGE UP (ref 0.7–1.5)
DIFF PNL FLD: ABNORMAL
EGFR: 75 ML/MIN/1.73M2 — SIGNIFICANT CHANGE UP
EGFR: 75 ML/MIN/1.73M2 — SIGNIFICANT CHANGE UP
EOSINOPHIL # BLD AUTO: 0.06 K/UL — SIGNIFICANT CHANGE UP (ref 0–0.7)
EOSINOPHIL NFR BLD AUTO: 0.7 % — SIGNIFICANT CHANGE UP (ref 0–8)
GLUCOSE SERPL-MCNC: 108 MG/DL — HIGH (ref 70–99)
GLUCOSE UR QL: NEGATIVE MG/DL — SIGNIFICANT CHANGE UP
HCT VFR BLD CALC: 43.8 % — SIGNIFICANT CHANGE UP (ref 37–47)
HGB BLD-MCNC: 14.6 G/DL — SIGNIFICANT CHANGE UP (ref 12–16)
IMM GRANULOCYTES NFR BLD AUTO: 0.3 % — SIGNIFICANT CHANGE UP (ref 0.1–0.3)
KETONES UR QL: NEGATIVE MG/DL — SIGNIFICANT CHANGE UP
LACTATE SERPL-SCNC: 2.6 MMOL/L — HIGH (ref 0.7–2)
LEUKOCYTE ESTERASE UR-ACNC: NEGATIVE — SIGNIFICANT CHANGE UP
LYMPHOCYTES # BLD AUTO: 1.56 K/UL — SIGNIFICANT CHANGE UP (ref 1.2–3.4)
LYMPHOCYTES # BLD AUTO: 18.2 % — LOW (ref 20.5–51.1)
MCHC RBC-ENTMCNC: 30.5 PG — SIGNIFICANT CHANGE UP (ref 27–31)
MCHC RBC-ENTMCNC: 33.3 G/DL — SIGNIFICANT CHANGE UP (ref 32–37)
MCV RBC AUTO: 91.4 FL — SIGNIFICANT CHANGE UP (ref 81–99)
MONOCYTES # BLD AUTO: 0.47 K/UL — SIGNIFICANT CHANGE UP (ref 0.1–0.6)
MONOCYTES NFR BLD AUTO: 5.5 % — SIGNIFICANT CHANGE UP (ref 1.7–9.3)
NEUTROPHILS # BLD AUTO: 6.42 K/UL — SIGNIFICANT CHANGE UP (ref 1.4–6.5)
NEUTROPHILS NFR BLD AUTO: 74.8 % — SIGNIFICANT CHANGE UP (ref 42.2–75.2)
NITRITE UR-MCNC: NEGATIVE — SIGNIFICANT CHANGE UP
NRBC BLD AUTO-RTO: 0 /100 WBCS — SIGNIFICANT CHANGE UP (ref 0–0)
PH UR: 7 — SIGNIFICANT CHANGE UP (ref 5–8)
PLATELET # BLD AUTO: 256 K/UL — SIGNIFICANT CHANGE UP (ref 130–400)
PMV BLD: 9.2 FL — SIGNIFICANT CHANGE UP (ref 7.4–10.4)
POTASSIUM SERPL-MCNC: 4.4 MMOL/L — SIGNIFICANT CHANGE UP (ref 3.5–5)
POTASSIUM SERPL-SCNC: 4.4 MMOL/L — SIGNIFICANT CHANGE UP (ref 3.5–5)
PROT SERPL-MCNC: 7.7 G/DL — SIGNIFICANT CHANGE UP (ref 6–8)
PROT UR-MCNC: NEGATIVE MG/DL — SIGNIFICANT CHANGE UP
RBC # BLD: 4.79 M/UL — SIGNIFICANT CHANGE UP (ref 4.2–5.4)
RBC # FLD: 12.8 % — SIGNIFICANT CHANGE UP (ref 11.5–14.5)
SODIUM SERPL-SCNC: 140 MMOL/L — SIGNIFICANT CHANGE UP (ref 135–146)
SP GR SPEC: 1.01 — SIGNIFICANT CHANGE UP (ref 1–1.03)
UROBILINOGEN FLD QL: 0.2 MG/DL — SIGNIFICANT CHANGE UP (ref 0.2–1)
WBC # BLD: 8.58 K/UL — SIGNIFICANT CHANGE UP (ref 4.8–10.8)
WBC # FLD AUTO: 8.58 K/UL — SIGNIFICANT CHANGE UP (ref 4.8–10.8)

## 2025-07-24 PROCEDURE — 99284 EMERGENCY DEPT VISIT MOD MDM: CPT | Mod: 25

## 2025-07-24 PROCEDURE — 83605 ASSAY OF LACTIC ACID: CPT

## 2025-07-24 PROCEDURE — 80053 COMPREHEN METABOLIC PANEL: CPT

## 2025-07-24 PROCEDURE — 99285 EMERGENCY DEPT VISIT HI MDM: CPT

## 2025-07-24 PROCEDURE — 36000 PLACE NEEDLE IN VEIN: CPT | Mod: XU

## 2025-07-24 PROCEDURE — 85025 COMPLETE CBC W/AUTO DIFF WBC: CPT

## 2025-07-24 PROCEDURE — 81001 URINALYSIS AUTO W/SCOPE: CPT

## 2025-07-24 PROCEDURE — 74174 CTA ABD&PLVS W/CONTRAST: CPT | Mod: 26

## 2025-07-24 PROCEDURE — 74174 CTA ABD&PLVS W/CONTRAST: CPT

## 2025-07-24 PROCEDURE — 36415 COLL VENOUS BLD VENIPUNCTURE: CPT

## 2025-07-24 RX ORDER — SODIUM CHLORIDE 9 G/1000ML
1000 INJECTION, SOLUTION INTRAVENOUS ONCE
Refills: 0 | Status: COMPLETED | OUTPATIENT
Start: 2025-07-24 | End: 2025-07-24

## 2025-07-24 RX ORDER — CEPHALEXIN 250 MG/1
1 CAPSULE ORAL
Qty: 14 | Refills: 0
Start: 2025-07-24 | End: 2025-07-30

## 2025-07-24 RX ADMIN — SODIUM CHLORIDE 1000 MILLILITER(S): 9 INJECTION, SOLUTION INTRAVENOUS at 15:36

## 2025-07-24 NOTE — ED PROVIDER NOTE - DIFFERENTIAL DIAGNOSIS
Differential Diagnosis colitis, pancreatitis, UTI, Diverticulitis, SBO, biliary colic, cholecystitis, cholangitis, pyelonephritis, aortic dissection, cellulitis

## 2025-07-24 NOTE — ED ADULT TRIAGE NOTE - HEIGHT IN CM
Weight Management:  Increase ounces of water intake to goal of 1/2 body weight per day.  Increase grams of protein intake to 1/2 body weight per day.    Limit sugar and carbohydrate intake.  Incorporate exercise into daily routine with goal of 150 minutes per week.      
165.1

## 2025-07-24 NOTE — ED PROVIDER NOTE - PHYSICAL EXAMINATION
+ reproducible pain at R psis, CONSTITUTIONAL: well-appearing, well nourished, non-toxic, NAD  SKIN: warm, dry, no rashes, lesions, erythema.   HEAD: NCAT  EYES: EOMI, PERRLA, no scleral icterus  ENT: Moist mucous membranes, normal pharynx with no erythema or exudates  NECK: non tender. Full ROM. No cervical LAD  CARD: RRR, cap refill <2 seconds  RESP: clear to ausculation b/l  ABD: soft, non-tender, No CVA tenderness  EXT: Full ROM, no bony tenderness, no pedal edema, no calf tenderness, 2+ distal pulses b/l in UE and LE.   MSK: + reproducible pain at R PISIS, normal sensation and strength of LE.   PSYCH: Cooperative, appropriate.

## 2025-07-24 NOTE — ED PROVIDER NOTE - CLINICAL SUMMARY MEDICAL DECISION MAKING FREE TEXT BOX
Pt is a 54y female presenting to the ED for evaluation of right lower quadrant abd pain radiating to her right leg, assoc with inc urinary frequency. sx for 3 days. no hematuria, no nausea or vomiting or diarrhea. on exam well appearing female vitals wnl, mild right lower quadrant abd ttp no cva ttp no warmth swelling erythema appreciated to rle, distal pulses intact bilaterally no temperature discrepancy. labs and imaging reviewed, clinically pt has no signs of right anterior thigh cellulitis to suggest infection despite ct results, no intraabd pathology. Keflex sent to pharmacy and pt instructed to take if symptoms do not improve or worsen. all results d/w pt & copies given, strict return precautions discussed, rec outpt f.u with pcp. Patient agreeable with plan and demonstrates understanding.

## 2025-07-24 NOTE — ED PROVIDER NOTE - PATIENT PORTAL LINK FT
You can access the FollowMyHealth Patient Portal offered by [facility] by registering at the following website: [email or web address]. By joining [employer]’s FollowMyHealth portal, you will also be able to view your health information using other applications (apps) compatible with our system.

## 2025-07-24 NOTE — ED ADULT NURSE NOTE - OBJECTIVE STATEMENT
Aox4 pt c/o R LE, groin and abdominal pain. IV placed, labs sent and fluids started per provider order

## 2025-07-24 NOTE — ED PROVIDER NOTE - OBJECTIVE STATEMENT
54-year-old female with past medical history of hypertension hyperlipidemia presents for right anterior thigh burning sensation and right sided low back pain associated with mild temperature of 99F x 3 days.  Patient was in urgent care this morning and sent in to ED for further evaluation.  Patient states the pain started when she was sleeping 3 nights ago and has not resolved.  Did not take any medication for it. Never had this type of episode before. Pain is non-radiating, not worse with movement, dull in nature, States she does wear tight leggings. No fevers, chills, cp, sob, abd pain,n/v/d. No weakness, numbness or tingling.

## 2025-07-30 ENCOUNTER — NON-APPOINTMENT (OUTPATIENT)
Age: 55
End: 2025-07-30

## 2025-07-30 ENCOUNTER — EMERGENCY (EMERGENCY)
Facility: HOSPITAL | Age: 55
LOS: 0 days | Discharge: HOME | End: 2025-07-30
Attending: EMERGENCY MEDICINE
Payer: COMMERCIAL

## 2025-07-30 VITALS
OXYGEN SATURATION: 96 % | RESPIRATION RATE: 18 BRPM | HEART RATE: 87 BPM | SYSTOLIC BLOOD PRESSURE: 121 MMHG | TEMPERATURE: 98 F | DIASTOLIC BLOOD PRESSURE: 88 MMHG

## 2025-07-30 VITALS
TEMPERATURE: 98 F | WEIGHT: 192.02 LBS | OXYGEN SATURATION: 97 % | SYSTOLIC BLOOD PRESSURE: 124 MMHG | HEIGHT: 65 IN | HEART RATE: 120 BPM | DIASTOLIC BLOOD PRESSURE: 89 MMHG | RESPIRATION RATE: 20 BRPM

## 2025-07-30 DIAGNOSIS — R19.7 DIARRHEA, UNSPECIFIED: ICD-10-CM

## 2025-07-30 DIAGNOSIS — Z98.890 OTHER SPECIFIED POSTPROCEDURAL STATES: Chronic | ICD-10-CM

## 2025-07-30 DIAGNOSIS — E86.0 DEHYDRATION: ICD-10-CM

## 2025-07-30 DIAGNOSIS — M79.651 PAIN IN RIGHT THIGH: ICD-10-CM

## 2025-07-30 DIAGNOSIS — Z90.49 ACQUIRED ABSENCE OF OTHER SPECIFIED PARTS OF DIGESTIVE TRACT: Chronic | ICD-10-CM

## 2025-07-30 DIAGNOSIS — J45.909 UNSPECIFIED ASTHMA, UNCOMPLICATED: ICD-10-CM

## 2025-07-30 LAB
ALBUMIN SERPL ELPH-MCNC: 4.9 G/DL — SIGNIFICANT CHANGE UP (ref 3.5–5.2)
ALP SERPL-CCNC: 91 U/L — SIGNIFICANT CHANGE UP (ref 30–115)
ALT FLD-CCNC: 22 U/L — SIGNIFICANT CHANGE UP (ref 0–41)
ANION GAP SERPL CALC-SCNC: 15 MMOL/L — HIGH (ref 7–14)
AST SERPL-CCNC: 23 U/L — SIGNIFICANT CHANGE UP (ref 0–41)
BASOPHILS # BLD AUTO: 0.04 K/UL — SIGNIFICANT CHANGE UP (ref 0–0.2)
BASOPHILS NFR BLD AUTO: 0.3 % — SIGNIFICANT CHANGE UP (ref 0–1)
BILIRUB SERPL-MCNC: 0.5 MG/DL — SIGNIFICANT CHANGE UP (ref 0.2–1.2)
BUN SERPL-MCNC: 10 MG/DL — SIGNIFICANT CHANGE UP (ref 10–20)
C DIFF GDH STL QL: NEGATIVE — SIGNIFICANT CHANGE UP
C DIFF GDH STL QL: SIGNIFICANT CHANGE UP
CALCIUM SERPL-MCNC: 10.6 MG/DL — HIGH (ref 8.4–10.5)
CHLORIDE SERPL-SCNC: 106 MMOL/L — SIGNIFICANT CHANGE UP (ref 98–110)
CK SERPL-CCNC: 112 U/L — SIGNIFICANT CHANGE UP (ref 0–225)
CO2 SERPL-SCNC: 19 MMOL/L — SIGNIFICANT CHANGE UP (ref 17–32)
CREAT SERPL-MCNC: 0.9 MG/DL — SIGNIFICANT CHANGE UP (ref 0.7–1.5)
EGFR: 75 ML/MIN/1.73M2 — SIGNIFICANT CHANGE UP
EGFR: 75 ML/MIN/1.73M2 — SIGNIFICANT CHANGE UP
EOSINOPHIL # BLD AUTO: 0.07 K/UL — SIGNIFICANT CHANGE UP (ref 0–0.7)
EOSINOPHIL NFR BLD AUTO: 0.6 % — SIGNIFICANT CHANGE UP (ref 0–8)
GLUCOSE SERPL-MCNC: 129 MG/DL — HIGH (ref 70–99)
HCT VFR BLD CALC: 45.6 % — SIGNIFICANT CHANGE UP (ref 37–47)
HGB BLD-MCNC: 15.4 G/DL — SIGNIFICANT CHANGE UP (ref 12–16)
IMM GRANULOCYTES NFR BLD AUTO: 0.2 % — SIGNIFICANT CHANGE UP (ref 0.1–0.3)
LIDOCAIN IGE QN: 39 U/L — SIGNIFICANT CHANGE UP (ref 7–60)
LYMPHOCYTES # BLD AUTO: 0.94 K/UL — LOW (ref 1.2–3.4)
LYMPHOCYTES # BLD AUTO: 7.7 % — LOW (ref 20.5–51.1)
MAGNESIUM SERPL-MCNC: 1.9 MG/DL — SIGNIFICANT CHANGE UP (ref 1.8–2.4)
MCHC RBC-ENTMCNC: 30.4 PG — SIGNIFICANT CHANGE UP (ref 27–31)
MCHC RBC-ENTMCNC: 33.8 G/DL — SIGNIFICANT CHANGE UP (ref 32–37)
MCV RBC AUTO: 89.9 FL — SIGNIFICANT CHANGE UP (ref 81–99)
MONOCYTES # BLD AUTO: 0.78 K/UL — HIGH (ref 0.1–0.6)
MONOCYTES NFR BLD AUTO: 6.4 % — SIGNIFICANT CHANGE UP (ref 1.7–9.3)
NEUTROPHILS # BLD AUTO: 10.37 K/UL — HIGH (ref 1.4–6.5)
NEUTROPHILS NFR BLD AUTO: 84.8 % — HIGH (ref 42.2–75.2)
NRBC BLD AUTO-RTO: 0 /100 WBCS — SIGNIFICANT CHANGE UP (ref 0–0)
PLATELET # BLD AUTO: 256 K/UL — SIGNIFICANT CHANGE UP (ref 130–400)
PMV BLD: 9.5 FL — SIGNIFICANT CHANGE UP (ref 7.4–10.4)
POTASSIUM SERPL-MCNC: 4.1 MMOL/L — SIGNIFICANT CHANGE UP (ref 3.5–5)
POTASSIUM SERPL-SCNC: 4.1 MMOL/L — SIGNIFICANT CHANGE UP (ref 3.5–5)
PROT SERPL-MCNC: 7.3 G/DL — SIGNIFICANT CHANGE UP (ref 6–8)
RBC # BLD: 5.07 M/UL — SIGNIFICANT CHANGE UP (ref 4.2–5.4)
RBC # FLD: 13 % — SIGNIFICANT CHANGE UP (ref 11.5–14.5)
SODIUM SERPL-SCNC: 140 MMOL/L — SIGNIFICANT CHANGE UP (ref 135–146)
WBC # BLD: 12.23 K/UL — HIGH (ref 4.8–10.8)
WBC # FLD AUTO: 12.23 K/UL — HIGH (ref 4.8–10.8)

## 2025-07-30 PROCEDURE — 36415 COLL VENOUS BLD VENIPUNCTURE: CPT

## 2025-07-30 PROCEDURE — 99284 EMERGENCY DEPT VISIT MOD MDM: CPT

## 2025-07-30 PROCEDURE — 93970 EXTREMITY STUDY: CPT

## 2025-07-30 PROCEDURE — 36000 PLACE NEEDLE IN VEIN: CPT

## 2025-07-30 PROCEDURE — 83690 ASSAY OF LIPASE: CPT

## 2025-07-30 PROCEDURE — 85025 COMPLETE CBC W/AUTO DIFF WBC: CPT

## 2025-07-30 PROCEDURE — 82550 ASSAY OF CK (CPK): CPT

## 2025-07-30 PROCEDURE — 87449 NOS EACH ORGANISM AG IA: CPT

## 2025-07-30 PROCEDURE — 83735 ASSAY OF MAGNESIUM: CPT

## 2025-07-30 PROCEDURE — 80053 COMPREHEN METABOLIC PANEL: CPT

## 2025-07-30 PROCEDURE — 87324 CLOSTRIDIUM AG IA: CPT

## 2025-07-30 PROCEDURE — 93970 EXTREMITY STUDY: CPT | Mod: 26

## 2025-07-30 PROCEDURE — 99284 EMERGENCY DEPT VISIT MOD MDM: CPT | Mod: 25

## 2025-07-30 RX ORDER — SODIUM CHLORIDE 9 G/1000ML
1000 INJECTION, SOLUTION INTRAVENOUS ONCE
Refills: 0 | Status: COMPLETED | OUTPATIENT
Start: 2025-07-30 | End: 2025-07-30

## 2025-07-30 RX ADMIN — SODIUM CHLORIDE 1000 MILLILITER(S): 9 INJECTION, SOLUTION INTRAVENOUS at 22:06

## 2025-07-30 RX ADMIN — SODIUM CHLORIDE 1000 MILLILITER(S): 9 INJECTION, SOLUTION INTRAVENOUS at 20:15

## 2025-07-30 NOTE — ED PROVIDER NOTE - PATIENT PORTAL LINK FT
You can access the FollowMyHealth Patient Portal offered by [facility] by registering at the following website: [email or web address]. By joining [employer]’s FollowMyHealth portal, you will also be able to view your health information using other applications (apps) compatible with our system.

## 2025-07-30 NOTE — ED ADULT NURSE NOTE - HOW OFTEN DO YOU HAVE A DRINK CONTAINING ALCOHOL?
Pt ambulatory to bathroom, steady gait; changed into gown and being placed on monitoring devices by Destiny harrison.   Never

## 2025-07-30 NOTE — ED PROVIDER NOTE - ATTENDING APP SHARED VISIT CONTRIBUTION OF CARE
53 yo f with pmh of asthma, presents with c/o diarrhea, palpitations.  pt was sent in by [facility].  pt finished course of keflex that was rx by ED a few days ago.  pt says initially came for evaluation of R thigh pain.  pt says they did a ct and found nonspecific stranding and dc her on keflex.  pt has had multiple episodes of watery diarrhea today.  feels dehydrated.  no n/v.  no fever or chills.  pt says she has severe abd cramping right before having a BM.  no urinary sx.  exam: nad, ncat, perrl, eomi, dry mm, rrr, ctab, abd soft, nt, nd aox3, minimally indurated R anterior thigh, no skin changes, no erythema, no warmth, mildly ttp imp: pt with diarrhea after being on course of keflex.  pt says [facility] care sent stool studies then sent pt to ED.  pt appears dehydrated.  will check labs and ivf.  offered meds, declined.  offered rpt ct a/p declined.  pt says she would like a duplex 55 yo f with pmh of asthma, presents with c/o diarrhea, palpitations.  pt was sent in by [facility].  pt finished course of keflex that was rx by ED a few days ago.  pt says initially came for evaluation of R thigh pain.  pt says they did a ct and found nonspecific stranding and dc her on keflex.  pt has had multiple episodes of watery diarrhea today.  feels dehydrated.  no n/v.  no fever or chills.  pt says she has severe abd cramping right before having a BM.  no urinary sx.  exam: nad, ncat, perrl, eomi, dry mm, reg tachy, ctab, abd soft, nt, nd aox3, minimally indurated R anterior thigh, no skin changes, no erythema, no warmth, mildly ttp imp: pt with diarrhea after being on course of keflex.  pt says [facility] care sent stool studies then sent pt to ED.  pt appears dehydrated.  will check labs and ivf.  offered meds, declined.  offered rpt ct a/p declined.  pt says she would like a duplex

## 2025-07-30 NOTE — ED PROVIDER NOTE - CLINICAL SUMMARY MEDICAL DECISION MAKING FREE TEXT BOX
Pt with diarrhea after course of keflex, neg for cdiff, benign abd exam.  declined pain meds and rpt ct of abd despite mild leukocytosis.  pt still with R thigh pain, neg duplex for dvt.  also offered repeat CT of lower extremity, but declined.  pt's vitals improved after ivf.  pt to f/u with pmd and ortho outpt.  Pt was given strict return to ED precautions and pt indicated that he/she understood. Any ordered labs and EKG were reviewed, Dr. Grace Garcia, attending physician.  Any imaging was ordered and reviewed by me, Dr. Grace Garcia, attending physician.  Appropriate medications for patient's presenting complaints were ordered and effects were reassessed.  Patient's records, if available,  (prior hospital, ED visit, and/or nursing home notes if available) were reviewed.  Additional history was obtained from EMS, family, and/or PCP (when available).  Escalation to admission/observation was considered.  1) However patient feels much better and is comfortable with discharge.  Appropriate follow-up was arranged.

## 2025-07-30 NOTE — ED PROVIDER NOTE - OBJECTIVE STATEMENT
55 yo female, pmh of [facility], recently seen in er for abd pain had negative cta abd and was dc on keflex notes today >14 episodes of diarrhea, sent in by [facility] for r/o c dif. Denies fever, chills, cp, sob, neck pain, visual changes, nv, dizziness, numbness, tingling.

## 2025-07-30 NOTE — ED ADULT NURSE NOTE - NSFALLUNIVINTERV_ED_ALL_ED
Bed/Stretcher in lowest position, wheels locked, appropriate side rails in place/Call bell, personal items and telephone in reach/Instruct patient to call for assistance before getting out of bed/chair/stretcher/Non-slip footwear applied when patient is off stretcher/[place] to call system/Physically safe environment - no spills, clutter or unnecessary equipment/Purposeful proactive rounding/Room/bathroom lighting operational, light cord in reach

## 2025-07-30 NOTE — ED PROVIDER NOTE - CARE PLAN
1 Principal Discharge DX:	Diarrhea  Secondary Diagnosis:	Dehydration  Secondary Diagnosis:	Pain of right thigh

## 2025-07-30 NOTE — ED ADULT TRIAGE NOTE - CHIEF COMPLAINT QUOTE
Presents to ED c/o multiple episodes of diarrhea today. Pt endorses recent abx use, [place] [facility] [place] diff.

## 2025-07-30 NOTE — ED PROVIDER NOTE - NSFOLLOWUPINSTRUCTIONS_ED_ALL_ED_FT
Diarrhea    Diarrhea is frequent loose or watery bowel movements that has many causes. Diarrhea can make you feel weak and cause you to become dehydrated. Diarrhea typically lasts 2–3 days, but can last longer if it is a sign of something more serious. Drink clear fluids to prevent dehydration. Eat bland, easy-to-digest foods as tolerated.     SEEK IMMEDIATE MEDICAL CARE IF YOU HAVE ANY OF THE FOLLOWING SYMPTOMS: high fevers, lightheadedness/dizziness, chest pain, black or bloody stools, shortness of breath, severe abdominal or back pain, or any signs of dehydration.    Leg Pain    WHAT YOU NEED TO KNOW:    Leg pain may be caused by a variety of health conditions. Your tests did not show any broken bones or blood clots.    DISCHARGE INSTRUCTIONS:    Return to the emergency department if:     You have a fever.      Your leg starts to swell.      Your leg pain gets worse.      You have numbness or tingling in your leg or toes.      You cannot put any weight on or move your leg.    Contact your healthcare provider if:     Your pain does not decrease, even after treatment.      You have questions or concerns about your condition or care.    Medicines:     NSAIDs, such as ibuprofen, help decrease swelling, pain, and fever. This medicine is available with or without a doctor's order. NSAIDs can cause stomach bleeding or kidney problems in certain people. If you take blood thinner medicine, always ask your healthcare provider if NSAIDs are safe for you. Always read the medicine label and follow directions.      Take your medicine as directed. Contact your healthcare provider if you think your medicine is not helping or if you have side effects. Tell him of her if you are allergic to any medicine. Keep a list of the medicines, vitamins, and herbs you take. Include the amounts, and when and why you take them. Bring the list or the pill bottles to follow-up visits. Carry your medicine list with you in case of an emergency.    Follow up with your healthcare provider as directed: You may need more tests to find the cause of your leg pain. You may need to see an orthopedic specialist or a physical therapist. Write down your questions so you remember to ask them during your visits.    Manage your leg pain:     Rest your injured leg so that it can heal. You may need an immobilizer, brace, or splint to limit the movement of your leg. You may need to avoid putting any weight on your leg for at least 48 hours. Return to normal activities as directed.      Ice the injury for 20 minutes every 4 hours for up to 24 hours, or as directed. Use an ice pack, or put crushed ice in a plastic bag. Cover it with a towel to protect your skin. Ice helps prevent tissue damage and decreases swelling and pain.      Elevate your injured leg above the level of your heart as often as you can. This will help decrease swelling and pain. If possible, prop your leg on pillows or blankets to keep the area elevated comfortably.       Use assistive devices as directed. You may need to use a cane or crutches. Assistive devices help decrease pain and pressure on your leg when you walk. Ask your healthcare provider for more information about assistive devices and how to use them correctly.      Maintain a healthy weight. Extra body weight can cause pressure and pain in your hip, knee, and ankle joints. Ask your healthcare provider how much you should weigh. Ask him to help you create a weight loss plan if you are overweight.

## 2025-07-30 NOTE — ED ADULT NURSE NOTE - CHIEF COMPLAINT QUOTE
Presents to ED c/o multiple episodes of diarrhea today. Pt endorses recent abx use, pt sent from [facility] [place] for r/o c diff.